# Patient Record
Sex: MALE | Race: WHITE | NOT HISPANIC OR LATINO | Employment: OTHER | URBAN - METROPOLITAN AREA
[De-identification: names, ages, dates, MRNs, and addresses within clinical notes are randomized per-mention and may not be internally consistent; named-entity substitution may affect disease eponyms.]

---

## 2023-03-16 LAB
ACTIVATED PARTIAL THROMBOPLASTIN TIME IN PPP BY COAGULATION ASSAY: 36 SEC (ref 26–39)
ANION GAP IN SER/PLAS: 14 MMOL/L (ref 10–20)
CALCIUM (MG/DL) IN SER/PLAS: 9.3 MG/DL (ref 8.6–10.3)
CARBON DIOXIDE, TOTAL (MMOL/L) IN SER/PLAS: 25 MMOL/L (ref 21–32)
CHLORIDE (MMOL/L) IN SER/PLAS: 106 MMOL/L (ref 98–107)
CREATININE (MG/DL) IN SER/PLAS: 0.91 MG/DL (ref 0.5–1.3)
ERYTHROCYTE DISTRIBUTION WIDTH (RATIO) BY AUTOMATED COUNT: 12.5 % (ref 11.5–14.5)
ERYTHROCYTE MEAN CORPUSCULAR HEMOGLOBIN CONCENTRATION (G/DL) BY AUTOMATED: 33.7 G/DL (ref 32–36)
ERYTHROCYTE MEAN CORPUSCULAR VOLUME (FL) BY AUTOMATED COUNT: 93 FL (ref 80–100)
ERYTHROCYTES (10*6/UL) IN BLOOD BY AUTOMATED COUNT: 5.29 X10E12/L (ref 4.5–5.9)
GFR MALE: >90 ML/MIN/1.73M2
GLUCOSE (MG/DL) IN SER/PLAS: 100 MG/DL (ref 74–99)
HEMATOCRIT (%) IN BLOOD BY AUTOMATED COUNT: 49.2 % (ref 41–52)
HEMOGLOBIN (G/DL) IN BLOOD: 16.6 G/DL (ref 13.5–17.5)
INR IN PPP BY COAGULATION ASSAY: 1.1 (ref 0.9–1.1)
LEUKOCYTES (10*3/UL) IN BLOOD BY AUTOMATED COUNT: 9.1 X10E9/L (ref 4.4–11.3)
NRBC (PER 100 WBCS) BY AUTOMATED COUNT: 0 /100 WBC (ref 0–0)
PLATELETS (10*3/UL) IN BLOOD AUTOMATED COUNT: 210 X10E9/L (ref 150–450)
POTASSIUM (MMOL/L) IN SER/PLAS: 4.2 MMOL/L (ref 3.5–5.3)
PROTHROMBIN TIME (PT) IN PPP BY COAGULATION ASSAY: 12.7 SEC (ref 9.8–13.4)
SODIUM (MMOL/L) IN SER/PLAS: 141 MMOL/L (ref 136–145)
UREA NITROGEN (MG/DL) IN SER/PLAS: 12 MG/DL (ref 6–23)

## 2023-03-17 LAB — URINE CULTURE: NORMAL

## 2023-03-27 ENCOUNTER — HOSPITAL ENCOUNTER (OUTPATIENT)
Dept: DATA CONVERSION | Facility: HOSPITAL | Age: 38
End: 2023-03-27
Attending: UROLOGY | Admitting: UROLOGY
Payer: COMMERCIAL

## 2023-03-27 DIAGNOSIS — Z90.89 ACQUIRED ABSENCE OF OTHER ORGANS: ICD-10-CM

## 2023-03-27 DIAGNOSIS — N43.3 HYDROCELE, UNSPECIFIED: ICD-10-CM

## 2023-03-27 DIAGNOSIS — Z87.19 PERSONAL HISTORY OF OTHER DISEASES OF THE DIGESTIVE SYSTEM: ICD-10-CM

## 2023-03-27 DIAGNOSIS — F17.200 NICOTINE DEPENDENCE, UNSPECIFIED, UNCOMPLICATED: ICD-10-CM

## 2023-03-27 DIAGNOSIS — R56.9 UNSPECIFIED CONVULSIONS (MULTI): ICD-10-CM

## 2023-04-17 LAB
COMPLETE PATHOLOGY REPORT: NORMAL
CONVERTED CLINICAL DIAGNOSIS-HISTORY: NORMAL
CONVERTED FINAL DIAGNOSIS: NORMAL
CONVERTED FINAL REPORT PDF LINK TO COPY AND PASTE: NORMAL
CONVERTED GROSS DESCRIPTION: NORMAL

## 2023-04-25 ENCOUNTER — TELEMEDICINE (OUTPATIENT)
Dept: PRIMARY CARE | Facility: CLINIC | Age: 38
End: 2023-04-25
Payer: COMMERCIAL

## 2023-04-25 DIAGNOSIS — J01.00 ACUTE MAXILLARY SINUSITIS, RECURRENCE NOT SPECIFIED: Primary | ICD-10-CM

## 2023-04-25 PROCEDURE — 99213 OFFICE O/P EST LOW 20 MIN: CPT | Performed by: FAMILY MEDICINE

## 2023-04-25 RX ORDER — SULFAMETHOXAZOLE AND TRIMETHOPRIM 800; 160 MG/1; MG/1
1 TABLET ORAL 2 TIMES DAILY
Qty: 20 TABLET | Refills: 0 | Status: SHIPPED | OUTPATIENT
Start: 2023-04-25 | End: 2023-05-05

## 2023-04-25 ASSESSMENT — ENCOUNTER SYMPTOMS
TROUBLE SWALLOWING: 0
STRIDOR: 0
VOMITING: 0
HEADACHES: 1
NECK PAIN: 1
COUGH: 0
DIARRHEA: 0
SWOLLEN GLANDS: 0
HOARSE VOICE: 1
ABDOMINAL PAIN: 0
SORE THROAT: 1
SHORTNESS OF BREATH: 0

## 2023-04-25 NOTE — PROGRESS NOTES
Subjective   Patient ID: [id number]   Virtual or Telephone Consent    An interactive audio and video telecommunication system which permits real time communications between the patient (at the originating site) and provider (at the distant site) was utilized to provide this telehealth service.   Verbal consent was requested and obtained from Lacho Calix on this date, 04/25/23 for a telehealth visit.     Lacho Calix is a 37 y.o. male who presents for No chief complaint on file..  Sore Throat   This is a new problem. The current episode started yesterday. The problem has been gradually worsening. The pain is worse on the left side. There has been no fever. The fever has been present for Less than 1 day. The pain is at a severity of 5/10. Associated symptoms include congestion, ear discharge, ear pain, headaches, a hoarse voice, a plugged ear sensation and neck pain. Pertinent negatives include no abdominal pain, coughing, diarrhea, drooling, shortness of breath, stridor, swollen glands, trouble swallowing or vomiting. He has had exposure to strep. He has had no exposure to mono.     Above answers confirmed with pt.  His wife had strep throat last week.  He started with this on Sunday.  No fevers.  Has a sore throat.  Objective     There were no vitals taken for this visit.     Physical Exam  Constitutional:       Appearance: Normal appearance.   Pulmonary:      Effort: Pulmonary effort is normal. No respiratory distress.   Neurological:      Mental Status: He is alert.         Assessment/Plan   Problem List Items Addressed This Visit    None  Visit Diagnoses       Acute maxillary sinusitis, recurrence not specified    -  Primary    Relevant Medications    sulfamethoxazole-trimethoprim (Bactrim DS) 800-160 mg tablet            Addi Mendenhall DO

## 2023-05-11 ENCOUNTER — TELEMEDICINE (OUTPATIENT)
Dept: PRIMARY CARE | Facility: CLINIC | Age: 38
End: 2023-05-11
Payer: COMMERCIAL

## 2023-05-11 DIAGNOSIS — J02.9 PHARYNGITIS, UNSPECIFIED ETIOLOGY: Primary | ICD-10-CM

## 2023-05-11 PROCEDURE — 99213 OFFICE O/P EST LOW 20 MIN: CPT | Performed by: FAMILY MEDICINE

## 2023-05-11 RX ORDER — AMOXICILLIN AND CLAVULANATE POTASSIUM 875; 125 MG/1; MG/1
875 TABLET, FILM COATED ORAL 2 TIMES DAILY
Qty: 20 TABLET | Refills: 0 | Status: SHIPPED | OUTPATIENT
Start: 2023-05-11 | End: 2023-05-21

## 2023-05-11 ASSESSMENT — ENCOUNTER SYMPTOMS
RHINORRHEA: 1
HEADACHES: 1
VOMITING: 0
COUGH: 1
SORE THROAT: 1
DIARRHEA: 0
NECK PAIN: 0
ABDOMINAL PAIN: 0

## 2023-05-11 NOTE — PROGRESS NOTES
Subjective   Patient ID: [id number]   Virtual or Telephone Consent    An interactive audio and video telecommunication system which permits real time communications between the patient (at the originating site) and provider (at the distant site) was utilized to provide this telehealth service.   Verbal consent was requested and obtained from Lacho Claix on this date, 05/11/23 for a telehealth visit.     Lacho Calix is a 37 y.o. male who presents for clogged ears.  Earache   There is pain in the left ear. This is a new problem. The current episode started in the past 7 days. The problem occurs constantly. The problem has been gradually worsening. There has been no fever. The fever has been present for Less than 1 day. The pain is at a severity of 5/10. Associated symptoms include coughing, ear discharge, headaches, hearing loss, rhinorrhea and a sore throat. Pertinent negatives include no abdominal pain, diarrhea, neck pain, rash or vomiting.     He has right ear pain with swallowing and a sore throat.  He had sinus problems earlier and the Bactrim helped.  He states these complaints have persisted despite the bactrim.    Objective     There were no vitals taken for this visit.     Physical Exam  Constitutional:       General: He is not in acute distress.     Appearance: Normal appearance. He is not toxic-appearing.   Pulmonary:      Effort: Pulmonary effort is normal. No respiratory distress.   Neurological:      General: No focal deficit present.      Mental Status: He is alert and oriented to person, place, and time.         Assessment/Plan   Problem List Items Addressed This Visit    None  Visit Diagnoses       Pharyngitis, unspecified etiology    -  Primary    Relevant Medications    amoxicillin-pot clavulanate (Augmentin) 875-125 mg tablet          I will prescribe augmentin to try to help with this infection.  Continue the use of Claritin D.  Return if this persists.  Addi Mendenhall, DO

## 2023-05-11 NOTE — PATIENT INSTRUCTIONS
I will prescribe augmentin to try to help with this infection.  Continue the use of Claritin D.  Return if this persists.

## 2023-09-07 VITALS — BODY MASS INDEX: 39.17 KG/M2 | HEIGHT: 75 IN | WEIGHT: 315 LBS

## 2023-09-14 NOTE — H&P
History of Present Illness:   History Present Illness:  Reason for surgery: left hydrocele   HPI:    37M with left hydrocele presents for left hydrocelectomy.     Allergies:        Allergies:  ·  Darvocet-N 100 : Unknown  ·  Cortone  Acetate: Unknown  ·  Percocet : Unknown    Home Medication Review:   Home Medications Reviewed: yes     Impression/Procedure:   ·  Impression and Planned Procedure: left hydrocele; left hydrocelectomy       ERAS (Enhanced Recovery After Surgery):  ·  ERAS Patient: no       Physical Exam by System:    Respiratory/Thorax: NLB   Cardiovascular: RR     Consent:   COVID-19 Consent:  ·  COVID-19 Risk Consent Surgeon has reviewed key risks related to the risk of andriy COVID-19 and if they contract COVID-19 what the risks are.     Attestation:   Note Completion:  I am a:  Resident/Fellow   Attending Attestation I saw and evaluated the patient.  I personally obtained the key and critical portions of the history and physical exam or was physically present for key and  critical portions performed by the resident/fellow. I reviewed the resident/fellow?s documentation and discussed the patient with the resident/fellow.  I agree with the resident/fellow?s medical decision making as documented in the note.     I personally evaluated the patient on 27-Mar-2023         Electronic Signatures:  Kadeem Carter)  (Signed 28-Mar-2023 06:33)   Authored: Note Completion   Co-Signer: History of Present Illness, Allergies, Home Medication Review, Impression/Procedure, ERAS, Physical Exam, Consent, Note Completion  Claudia Jackson (Resident))  (Signed 27-Mar-2023 13:39)   Authored: History of Present Illness, Allergies, Home  Medication Review, Impression/Procedure, ERAS, Physical Exam, Consent, Note Completion      Last Updated: 28-Mar-2023 06:33 by Kadeem Carter)

## 2023-10-02 NOTE — OP NOTE
PROCEDURE DETAILS    Preoperative Diagnosis:  left hydrocele   Postoperative Diagnosis:  left hydrocele   Surgeon: Dr. Kadeem Carter   Resident/Fellow/Other Assistant: Dr. Claudia Jackson     Procedure:  1. left hydrocelectomy   Estimated Blood Loss: 5cc  Findings: left septated hydrocele   Specimens(s) Collected: yes,  hydrocele sac    Drains and/or Catheters: none        Operative Report:   This is a 37-year-old male who presented with left-sided scrotal swelling and was found to have a left hydrocele on exam.  The hydrocele was confirmed on ultrasound  examination.  He presents today for left hydrocelectomy.  The procedure including the risk and benefits were explained to the patient and informed consent was obtained.    Procedure: The patient was brought to the operating room placed on table in the supine position.  A timeout is performed confirming patient and  the procedure.  Preoperative antibiotics, 3 g Ancef, were administered and general anesthesia was induced.  He was prepped and draped in standard fashion.  A transverse 4 cm left lower scrotal incision was made in the left hemiscrotum.  The dartos was  dissected using electrocautery down to the hydrocele sac.  The testicle and hydrocele sac were delivered through the incision and further dissection of dartos was completed until the cord was identified and isolated.  The hydrocele was noted to be septated.   The hydrocele sac was opened and drained with return of straw-colored fluid.  Excess hydrocele sac was excised and sent to pathology for analysis.  The hydrocele was then repaired in a Jaboulay fashion inverting the edges of the hydrocele sac and closing  them with a 2-0 Vicryl in a running fashion for hemostasis.  Care was taken to not place too much tension on the cord and do not involve the epididymis.  Hemostasis was confirmed and the testicle was placed back into the left hemiscrotum.  The dartos  was closed with a running 2-0 Vicryl.  Again  hemostasis was confirmed.  The skin was then closed with a running 4-0 Vicryl horizontal mattress stitch and Dermabond.  Local anesthetic was injected around the incision and a mummy wrap using Coban was applied.   This concluded the procedure.  Patient tolerated procedure well.  He was awoken from general anesthesia and taken to PACU in stable condition.                        Attestation:   Note Completion:  Attending Attestation I was present for the entire procedure    I am a: Resident/Fellow         Electronic Signatures:  Kadeem Carter)  (Signed 28-Mar-2023 06:34)   Authored: Note Completion   Co-Signer: Post-Operative Note, Chart Review, Note Completion  Claudia Jackson (Resident))  (Signed 27-Mar-2023 15:40)   Authored: Post-Operative Note, Chart Review, Note Completion      Last Updated: 28-Mar-2023 06:34 by Kadeem Carter)

## 2024-01-05 DIAGNOSIS — K21.9 GASTRO-ESOPHAGEAL REFLUX DISEASE WITHOUT ESOPHAGITIS: ICD-10-CM

## 2024-01-05 RX ORDER — OMEPRAZOLE 40 MG/1
CAPSULE, DELAYED RELEASE ORAL
Qty: 30 CAPSULE | Refills: 5 | Status: SHIPPED | OUTPATIENT
Start: 2024-01-05

## 2024-01-10 ENCOUNTER — HOSPITAL ENCOUNTER (OUTPATIENT)
Facility: HOSPITAL | Age: 39
Setting detail: OUTPATIENT SURGERY
Discharge: HOME | End: 2024-01-10
Attending: PODIATRIST | Admitting: PODIATRIST
Payer: COMMERCIAL

## 2024-01-10 ENCOUNTER — ANESTHESIA (OUTPATIENT)
Dept: OPERATING ROOM | Facility: HOSPITAL | Age: 39
End: 2024-01-10
Payer: COMMERCIAL

## 2024-01-10 ENCOUNTER — PHARMACY VISIT (OUTPATIENT)
Dept: PHARMACY | Facility: CLINIC | Age: 39
End: 2024-01-10
Payer: COMMERCIAL

## 2024-01-10 ENCOUNTER — ANESTHESIA EVENT (OUTPATIENT)
Dept: OPERATING ROOM | Facility: HOSPITAL | Age: 39
End: 2024-01-10
Payer: COMMERCIAL

## 2024-01-10 VITALS
RESPIRATION RATE: 18 BRPM | SYSTOLIC BLOOD PRESSURE: 142 MMHG | TEMPERATURE: 96.8 F | BODY MASS INDEX: 46.42 KG/M2 | DIASTOLIC BLOOD PRESSURE: 77 MMHG | WEIGHT: 315 LBS | OXYGEN SATURATION: 96 % | HEART RATE: 79 BPM

## 2024-01-10 DIAGNOSIS — M77.32 CALCANEAL SPUR OF LEFT FOOT: ICD-10-CM

## 2024-01-10 DIAGNOSIS — M77.52 TENDINITIS OF LEFT ANKLE: ICD-10-CM

## 2024-01-10 DIAGNOSIS — L84 CORNS AND CALLOSITIES: ICD-10-CM

## 2024-01-10 DIAGNOSIS — Z98.890 POSTOPERATIVE STATE: Primary | ICD-10-CM

## 2024-01-10 DIAGNOSIS — M76.62 TENDONITIS, ACHILLES, LEFT: ICD-10-CM

## 2024-01-10 DIAGNOSIS — G89.18 POSTOPERATIVE PAIN: ICD-10-CM

## 2024-01-10 PROCEDURE — 88305 TISSUE EXAM BY PATHOLOGIST: CPT | Performed by: PATHOLOGY

## 2024-01-10 PROCEDURE — 2500000005 HC RX 250 GENERAL PHARMACY W/O HCPCS: Performed by: ANESTHESIOLOGY

## 2024-01-10 PROCEDURE — 3700000001 HC GENERAL ANESTHESIA TIME - INITIAL BASE CHARGE: Performed by: PODIATRIST

## 2024-01-10 PROCEDURE — 2780000003 HC OR 278 NO HCPCS: Performed by: PODIATRIST

## 2024-01-10 PROCEDURE — 3600000008 HC OR TIME - EACH INCREMENTAL 1 MINUTE - PROCEDURE LEVEL THREE: Performed by: PODIATRIST

## 2024-01-10 PROCEDURE — RXMED WILLOW AMBULATORY MEDICATION CHARGE

## 2024-01-10 PROCEDURE — 2720000007 HC OR 272 NO HCPCS: Performed by: PODIATRIST

## 2024-01-10 PROCEDURE — 3700000002 HC GENERAL ANESTHESIA TIME - EACH INCREMENTAL 1 MINUTE: Performed by: PODIATRIST

## 2024-01-10 PROCEDURE — 3600000003 HC OR TIME - INITIAL BASE CHARGE - PROCEDURE LEVEL THREE: Performed by: PODIATRIST

## 2024-01-10 PROCEDURE — 2500000004 HC RX 250 GENERAL PHARMACY W/ HCPCS (ALT 636 FOR OP/ED)

## 2024-01-10 PROCEDURE — 2500000004 HC RX 250 GENERAL PHARMACY W/ HCPCS (ALT 636 FOR OP/ED): Performed by: ANESTHESIOLOGY

## 2024-01-10 PROCEDURE — 2500000004 HC RX 250 GENERAL PHARMACY W/ HCPCS (ALT 636 FOR OP/ED): Performed by: PODIATRIST

## 2024-01-10 PROCEDURE — 7100000009 HC PHASE TWO TIME - INITIAL BASE CHARGE: Performed by: PODIATRIST

## 2024-01-10 PROCEDURE — 88305 TISSUE EXAM BY PATHOLOGIST: CPT | Mod: TC | Performed by: PODIATRIST

## 2024-01-10 PROCEDURE — 2500000002 HC RX 250 W HCPCS SELF ADMINISTERED DRUGS (ALT 637 FOR MEDICARE OP, ALT 636 FOR OP/ED): Performed by: ANESTHESIOLOGY

## 2024-01-10 PROCEDURE — 7100000010 HC PHASE TWO TIME - EACH INCREMENTAL 1 MINUTE: Performed by: PODIATRIST

## 2024-01-10 PROCEDURE — 94760 N-INVAS EAR/PLS OXIMETRY 1: CPT

## 2024-01-10 PROCEDURE — 7100000001 HC RECOVERY ROOM TIME - INITIAL BASE CHARGE: Performed by: PODIATRIST

## 2024-01-10 PROCEDURE — 7100000002 HC RECOVERY ROOM TIME - EACH INCREMENTAL 1 MINUTE: Performed by: PODIATRIST

## 2024-01-10 PROCEDURE — C1713 ANCHOR/SCREW BN/BN,TIS/BN: HCPCS | Performed by: PODIATRIST

## 2024-01-10 DEVICE — IMPL SYS,BIO-COMP ACHILLES SPEEDBRG
Type: IMPLANTABLE DEVICE | Site: ANKLE | Status: FUNCTIONAL
Brand: ARTHREX®

## 2024-01-10 RX ORDER — SODIUM CHLORIDE, SODIUM LACTATE, POTASSIUM CHLORIDE, CALCIUM CHLORIDE 600; 310; 30; 20 MG/100ML; MG/100ML; MG/100ML; MG/100ML
50 INJECTION, SOLUTION INTRAVENOUS CONTINUOUS
Status: DISCONTINUED | OUTPATIENT
Start: 2024-01-10 | End: 2024-01-10 | Stop reason: HOSPADM

## 2024-01-10 RX ORDER — DEXAMETHASONE SODIUM PHOSPHATE 4 MG/ML
INJECTION, SOLUTION INTRA-ARTICULAR; INTRALESIONAL; INTRAMUSCULAR; INTRAVENOUS; SOFT TISSUE AS NEEDED
Status: DISCONTINUED | OUTPATIENT
Start: 2024-01-10 | End: 2024-01-10

## 2024-01-10 RX ORDER — ONDANSETRON HYDROCHLORIDE 2 MG/ML
4 INJECTION, SOLUTION INTRAVENOUS ONCE AS NEEDED
Status: DISCONTINUED | OUTPATIENT
Start: 2024-01-10 | End: 2024-01-10 | Stop reason: HOSPADM

## 2024-01-10 RX ORDER — MIDAZOLAM HYDROCHLORIDE 1 MG/ML
INJECTION, SOLUTION INTRAMUSCULAR; INTRAVENOUS AS NEEDED
Status: DISCONTINUED | OUTPATIENT
Start: 2024-01-10 | End: 2024-01-10

## 2024-01-10 RX ORDER — HYDROCODONE BITARTRATE AND ACETAMINOPHEN 5; 325 MG/1; MG/1
1 TABLET ORAL 4 TIMES DAILY PRN
Qty: 28 TABLET | Refills: 0
Start: 2024-01-10 | End: 2024-01-17

## 2024-01-10 RX ORDER — CEFAZOLIN SODIUM IN 0.9 % NACL 3 G/100 ML
3 INTRAVENOUS SOLUTION, PIGGYBACK (ML) INTRAVENOUS ONCE
Status: COMPLETED | OUTPATIENT
Start: 2024-01-10 | End: 2024-01-10

## 2024-01-10 RX ORDER — ONDANSETRON 4 MG/1
8 TABLET, FILM COATED ORAL EVERY 8 HOURS PRN
Qty: 60 TABLET | Refills: 0 | Status: SHIPPED | OUTPATIENT
Start: 2024-01-10 | End: 2024-02-09

## 2024-01-10 RX ORDER — SODIUM CHLORIDE, SODIUM LACTATE, POTASSIUM CHLORIDE, CALCIUM CHLORIDE 600; 310; 30; 20 MG/100ML; MG/100ML; MG/100ML; MG/100ML
100 INJECTION, SOLUTION INTRAVENOUS CONTINUOUS
Status: DISCONTINUED | OUTPATIENT
Start: 2024-01-10 | End: 2024-01-10 | Stop reason: HOSPADM

## 2024-01-10 RX ORDER — ROCURONIUM BROMIDE 10 MG/ML
INJECTION, SOLUTION INTRAVENOUS AS NEEDED
Status: DISCONTINUED | OUTPATIENT
Start: 2024-01-10 | End: 2024-01-10

## 2024-01-10 RX ORDER — HYDRALAZINE HYDROCHLORIDE 20 MG/ML
5 INJECTION INTRAMUSCULAR; INTRAVENOUS EVERY 30 MIN PRN
Status: DISCONTINUED | OUTPATIENT
Start: 2024-01-10 | End: 2024-01-10 | Stop reason: HOSPADM

## 2024-01-10 RX ORDER — BUPIVACAINE HYDROCHLORIDE 5 MG/ML
INJECTION, SOLUTION EPIDURAL; INTRACAUDAL AS NEEDED
Status: DISCONTINUED | OUTPATIENT
Start: 2024-01-10 | End: 2024-01-10 | Stop reason: HOSPADM

## 2024-01-10 RX ORDER — PROPOFOL 10 MG/ML
INJECTION, EMULSION INTRAVENOUS AS NEEDED
Status: DISCONTINUED | OUTPATIENT
Start: 2024-01-10 | End: 2024-01-10

## 2024-01-10 RX ORDER — ALBUTEROL SULFATE 0.83 MG/ML
2.5 SOLUTION RESPIRATORY (INHALATION) ONCE AS NEEDED
Status: COMPLETED | OUTPATIENT
Start: 2024-01-10 | End: 2024-01-10

## 2024-01-10 RX ORDER — LIDOCAINE HYDROCHLORIDE 10 MG/ML
0.1 INJECTION, SOLUTION EPIDURAL; INFILTRATION; INTRACAUDAL; PERINEURAL ONCE
Status: DISCONTINUED | OUTPATIENT
Start: 2024-01-10 | End: 2024-01-10 | Stop reason: HOSPADM

## 2024-01-10 RX ORDER — SUCCINYLCHOLINE CHLORIDE 20 MG/ML
INJECTION INTRAMUSCULAR; INTRAVENOUS AS NEEDED
Status: DISCONTINUED | OUTPATIENT
Start: 2024-01-10 | End: 2024-01-10

## 2024-01-10 RX ORDER — MIDAZOLAM HYDROCHLORIDE 5 MG/ML
1 INJECTION, SOLUTION INTRAMUSCULAR; INTRAVENOUS ONCE AS NEEDED
Status: DISCONTINUED | OUTPATIENT
Start: 2024-01-10 | End: 2024-01-10 | Stop reason: HOSPADM

## 2024-01-10 RX ORDER — ONDANSETRON HYDROCHLORIDE 2 MG/ML
INJECTION, SOLUTION INTRAVENOUS AS NEEDED
Status: DISCONTINUED | OUTPATIENT
Start: 2024-01-10 | End: 2024-01-10

## 2024-01-10 RX ORDER — CYCLOBENZAPRINE HCL 5 MG
5 TABLET ORAL NIGHTLY PRN
Qty: 20 TABLET | Refills: 0 | OUTPATIENT
Start: 2024-01-10 | End: 2024-01-30

## 2024-01-10 RX ORDER — ASPIRIN 325 MG
325 TABLET, DELAYED RELEASE (ENTERIC COATED) ORAL DAILY
Qty: 30 TABLET | Refills: 0 | Status: SHIPPED | OUTPATIENT
Start: 2024-01-10 | End: 2024-02-09

## 2024-01-10 RX ORDER — FENTANYL CITRATE 50 UG/ML
INJECTION, SOLUTION INTRAMUSCULAR; INTRAVENOUS AS NEEDED
Status: DISCONTINUED | OUTPATIENT
Start: 2024-01-10 | End: 2024-01-10

## 2024-01-10 RX ORDER — HYDROCODONE BITARTRATE AND ACETAMINOPHEN 5; 325 MG/1; MG/1
1 TABLET ORAL EVERY 6 HOURS PRN
Qty: 28 TABLET | Refills: 0 | Status: SHIPPED | OUTPATIENT
Start: 2024-01-10 | End: 2024-01-17

## 2024-01-10 RX ORDER — FENTANYL CITRATE 50 UG/ML
50 INJECTION, SOLUTION INTRAMUSCULAR; INTRAVENOUS EVERY 5 MIN PRN
Status: DISCONTINUED | OUTPATIENT
Start: 2024-01-10 | End: 2024-01-10 | Stop reason: HOSPADM

## 2024-01-10 RX ORDER — CYCLOBENZAPRINE HCL 10 MG
10 TABLET ORAL 3 TIMES DAILY PRN
Qty: 45 TABLET | Refills: 0 | Status: SHIPPED | OUTPATIENT
Start: 2024-01-10 | End: 2024-03-05 | Stop reason: WASHOUT

## 2024-01-10 RX ADMIN — ONDANSETRON HYDROCHLORIDE 4 MG: 2 INJECTION INTRAMUSCULAR; INTRAVENOUS at 10:29

## 2024-01-10 RX ADMIN — ROCURONIUM BROMIDE 30 MG: 10 INJECTION, SOLUTION INTRAVENOUS at 10:33

## 2024-01-10 RX ADMIN — FENTANYL CITRATE 50 MCG: 50 INJECTION INTRAMUSCULAR; INTRAVENOUS at 11:46

## 2024-01-10 RX ADMIN — FENTANYL CITRATE 50 MCG: 50 INJECTION INTRAMUSCULAR; INTRAVENOUS at 12:00

## 2024-01-10 RX ADMIN — FENTANYL CITRATE 50 MCG: 0.05 INJECTION, SOLUTION INTRAMUSCULAR; INTRAVENOUS at 10:09

## 2024-01-10 RX ADMIN — MIDAZOLAM HYDROCHLORIDE 3 MG: 1 INJECTION, SOLUTION INTRAMUSCULAR; INTRAVENOUS at 10:09

## 2024-01-10 RX ADMIN — ALBUTEROL SULFATE 2.5 MG: 2.5 SOLUTION RESPIRATORY (INHALATION) at 11:38

## 2024-01-10 RX ADMIN — FENTANYL CITRATE 100 MCG: 0.05 INJECTION, SOLUTION INTRAMUSCULAR; INTRAVENOUS at 10:29

## 2024-01-10 RX ADMIN — SUCCINYLCHOLINE CHLORIDE 160 MG: 20 INJECTION, SOLUTION INTRAMUSCULAR; INTRAVENOUS at 10:09

## 2024-01-10 RX ADMIN — SUGAMMADEX 200 MG: 100 INJECTION, SOLUTION INTRAVENOUS at 11:31

## 2024-01-10 RX ADMIN — ROCURONIUM BROMIDE 50 MG: 10 INJECTION, SOLUTION INTRAVENOUS at 10:09

## 2024-01-10 RX ADMIN — DEXAMETHASONE SODIUM PHOSPHATE 4 MG: 4 INJECTION, SOLUTION INTRA-ARTICULAR; INTRALESIONAL; INTRAMUSCULAR; INTRAVENOUS; SOFT TISSUE at 10:29

## 2024-01-10 RX ADMIN — Medication 3 G: at 10:14

## 2024-01-10 RX ADMIN — PROPOFOL 200 MG: 10 INJECTION, EMULSION INTRAVENOUS at 10:09

## 2024-01-10 RX ADMIN — HYDROMORPHONE HYDROCHLORIDE 0.4 MG: 2 INJECTION, SOLUTION INTRAMUSCULAR; INTRAVENOUS; SUBCUTANEOUS at 12:14

## 2024-01-10 SDOH — HEALTH STABILITY: MENTAL HEALTH: CURRENT SMOKER: 0

## 2024-01-10 ASSESSMENT — PAIN - FUNCTIONAL ASSESSMENT
PAIN_FUNCTIONAL_ASSESSMENT: 0-10

## 2024-01-10 ASSESSMENT — PAIN SCALES - GENERAL
PAINLEVEL_OUTOF10: 7
PAINLEVEL_OUTOF10: 10 - WORST POSSIBLE PAIN
PAINLEVEL_OUTOF10: 6
PAINLEVEL_OUTOF10: 2
PAINLEVEL_OUTOF10: 5 - MODERATE PAIN
PAIN_LEVEL: 2
PAINLEVEL_OUTOF10: 3
PAINLEVEL_OUTOF10: 6

## 2024-01-10 ASSESSMENT — PAIN DESCRIPTION - LOCATION
LOCATION: FOOT

## 2024-01-10 ASSESSMENT — PAIN DESCRIPTION - ORIENTATION
ORIENTATION: LEFT

## 2024-01-10 ASSESSMENT — COLUMBIA-SUICIDE SEVERITY RATING SCALE - C-SSRS
1. IN THE PAST MONTH, HAVE YOU WISHED YOU WERE DEAD OR WISHED YOU COULD GO TO SLEEP AND NOT WAKE UP?: NO
2. HAVE YOU ACTUALLY HAD ANY THOUGHTS OF KILLING YOURSELF?: NO
6. HAVE YOU EVER DONE ANYTHING, STARTED TO DO ANYTHING, OR PREPARED TO DO ANYTHING TO END YOUR LIFE?: NO

## 2024-01-10 ASSESSMENT — PAIN DESCRIPTION - DESCRIPTORS
DESCRIPTORS: ACHING
DESCRIPTORS: ACHING

## 2024-01-10 NOTE — DISCHARGE INSTRUCTIONS
PODIATRIC SURGERY POST-OP INSTRUCTIONS    YOU HAD ANESTHESIA:  You must have a responsible adult drive you home and stay with you for the first 24 hours.    Do not drive a car or drink any alcohol for 24 hours after surgery.  Do not do any strenuous work or activity for the next 24 hours.  You may have mild nausea, a sore throat or raspy voice for a few days.    You received a block prior to your procedure. You should expect the surgical extremity to remain numb for the next 24-48 hours.     POST-OP INSTRUCTIONS:  Keep dressing clean, dry and intact until your first post-operative appointment.  Do not remove surgical dressing. You may need to loosen if necessary.   Do not shower unless using a cast protector to protect dressing.  If dressing gets wet, please call office immediately as this can lead to increased risk of infection or wound dehiscence.   Elevate surgical extremity as much as possible to help with pain and swelling.  Place ice pack behind knee of surgical extremity (20 minutes on/20 minutes off while awake). After 24 hours use ice behind the knee as needed for comfort.  Weight Bearing Status: Please remain non-weight bearing to the surgical extremity with posterior splint in place utilizing crutches, knee scooter or walker.  Please resume all home medications.   Post-Operative Medications: You were prescribed Norco for pain; please take as directed on the label. You may take Tylenol for pain; please take as directed on bottle. You were prescribed Xarelto (Rivaroxaban) 10 mg for post-operative blood clot prevention; please take as directed on the label. You were prescribed Flexeril (Cyclobenzaprine) 10 mg for muscle spasm control; please take as directed on the label.   Post-operative appointment with Dr. Nieves for 1/15/24 @ 2:00 PM at the [facility]. Please call to schedule if not already scheduled.  If you have any problems or notice any unusual symptoms such as bleeding, excessive pain, fever,  redness, swelling and/or discharge please call your Dr. Neives's office at [phone].    WHEN TO CALL YOUR DOCTOR:  Fever (>100.4°F or 38.0°C) or chills.  Incision problems such as redness, warmth, swelling, or foul smelling drainage.  Severe nausea or persistent vomiting.  Pain and swelling in your legs, especially if it is only on one side and not the other.  Pain with urination, cloudy urine, or foul smelling urine.  Or if you have any other problems or questions.  CALL 911 or go to the ED if you have any shortness of breath, difficulty breathing, or chest pain.

## 2024-01-10 NOTE — ANESTHESIA POSTPROCEDURE EVALUATION
Patient: Lacho Calix    Procedure Summary       Date: 01/10/24 Room / Location: [facility] / [facility]    Anesthesia Start: 0959 Anesthesia Stop: 1138    Procedures:       Excision of Calcaneal Spur (Left: Ankle)      Secondary Repair of Achilles (Left: Ankle)      Paring of Callus, EXCISION OF SOFT TISSUE MASS (Left: Ankle) Diagnosis:       Calcaneal spur of left foot      Tendinitis of left ankle      Corns and callosities      Tendonitis, Achilles, left      (Calcaneal spur, left. Navneet's deformity, left. Pain callus, left. Achilles tendinitis, left.)    Surgeons: Amanuel Nieves DPM Responsible Provider: Erik Mukherjee MD    Anesthesia Type: general ASA Status: 2            Anesthesia Type: general    Vitals Value Taken Time   /77 01/10/24 1138   Temp 37 01/10/24 1138   Pulse 76 01/10/24 1138   Resp 14 01/10/24 1138   SpO2 96 01/10/24 1138       Anesthesia Post Evaluation    Patient location during evaluation: PACU  Patient participation: complete - patient participated  Level of consciousness: sleepy but conscious  Pain score: 2  Pain management: adequate  Multimodal analgesia pain management approach  Airway patency: patent  Cardiovascular status: acceptable  Respiratory status: acceptable  Hydration status: acceptable  Postoperative Nausea and Vomiting: none        There were no known notable events for this encounter.

## 2024-01-10 NOTE — H&P
Surgical History & Physical    Patient Name and MRN: LACHO CALIX, [id number]  PCP: Addi Mendenhall DO     HPI:  Lacho Calix 38 y.o. male presents for Left Retrocalcaneal exostectomy with secondary repair of Achilles tendon with Dr. Nieves.     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    Personal history of other diseases of the musculoskeletal system and connective tissue 01/11/2016    History of back pain    Personal history of other diseases of the musculoskeletal system and connective tissue 04/07/2017    History of osteoarthritis    Personal history of other diseases of the nervous system and sense organs 08/13/2015    History of ear pain    Personal history of other diseases of the nervous system and sense organs 08/13/2015    History of impacted cerumen    Personal history of other diseases of the nervous system and sense organs 01/15/2015    History of ear pain    Personal history of other diseases of the respiratory system 08/12/2016    History of acute bronchitis    Personal history of other specified conditions 12/16/2016    History of headache    Personal history of other specified conditions 02/11/2020    History of syncope    Personal history of other specified conditions 07/18/2022    History of insomnia    Personal history of other specified conditions 10/08/2015    History of headache    Pleurodynia  02/11/2020    Chest pain, pleuritic     Past Surgical History:   Procedure Laterality Date    BACK SURGERY  06/26/2013    Back Surgery    KNEE SURGERY  03/27/2014    Knee Surgery    OTHER SURGICAL HISTORY  04/10/2014    Brain Surgery    OTHER SURGICAL HISTORY  04/10/2014    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  Social History     Tobacco Use    Smoking status: Former     Types: Cigarettes, Cigars    Smokeless tobacco: Never   Substance Use Topics    Alcohol use: Yes     Comment: socially    Drug use: Never     Prior to Admission Medications   Prescriptions Last Dose Informant Patient Reported? Taking?   lorata-dine D  mg 24 hr tablet More than a month  Yes No   Sig: Take 1 tablet by mouth once daily.   omeprazole (PriLOSEC) 40 mg DR capsule 1/9/2024  Yes Yes   Sig: Take 1 capsule (40 mg) by mouth once daily.   omeprazole (PriLOSEC) 40 mg DR capsule   No No   Sig: TAKE 1 CAPSULE BY MOUTH EVERY DAY.   tiZANidine (Zanaflex) 4 mg tablet 1/9/2024  Yes Yes   Sig: Take 2 tablets (8 mg) by mouth every 8 hours if needed.      Facility-Administered Medications: None         REVIEW OF SYSTEMS:  Constitutional:  Negative for fatigue and fever.   Respiratory:  Negative for cough, chest tightness and shortness of breath.    Cardiovascular:  Negative for chest pain, palpitations and leg swelling.   Gastrointestinal:  Negative for abdominal pain, diarrhea and nausea.   Skin: Negative.    Psychiatric/Behavioral: Negative.         PHYSICAL EXAM:  [unfilled]  Patient Vitals for the past 24 hrs:   Temp Temp src Pulse Resp   01/10/24 0918 36.4 °C (97.5 °F) Temporal 97 18       Constitutional:       Appearance: Normal appearance.   Cardiovascular:      Rate and Rhythm: Normal rate and regular rhythm.      Pulses: Normal pulses.   Pulmonary:      Effort: Pulmonary effort is normal.      Breath sounds: Normal breath sounds.   Abdominal:      General:  Abdomen is flat. Bowel sounds are normal.      Palpations: Abdomen is soft.   Musculoskeletal:         General: Normal range of motion.   Neurological:      Mental Status: She is alert.        ASSESSMENT:  38 y.o. male  presenting with Left insertionL Achilles tendinopathy and Navneet's deformity, which has been unrelieved by conservative treatment. 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    PLAN:  Patient was educated on the details of the procedure as well as medically reasonable risks, benefits, alternatives and prognosis. Patient was educated to their apparent understanding on potential complications including but not limited to infection, recurrence, persistent pain, swelling, disability, nerve injury/entrapment, healing complications, complications with anesthesia, and deep vein thrombosis/pulmonary embolism. All questions were answered to the patient’s apparent satisfaction.  No guarantees were given as to the outcome of the surgery. Patient was consented for Left Retrocalcaneal exostectomy with secondary repair of Achilles tendon.    ERAS patient?: No    COVID-19 Risk Consent:   Surgeon has reviewed the key risks related to andriy COVID-19 and subsequent sequelae.     01/10/24 at 9:21 AM - Nick Carreon DPM

## 2024-01-10 NOTE — ANESTHESIA PROCEDURE NOTES
Airway  Date/Time: 1/10/2024 10:10 AM  Urgency: elective    Airway not difficult    Staffing  Performed: attending   Authorized by: Erik Mukherjee MD    Performed by: Erik Mukherjee MD  Patient location during procedure: OR    Indications and Patient Condition  Indications for airway management: anesthesia  Spontaneous Ventilation: absent  Sedation level: deep  Preoxygenated: yes  Patient position: sniffing  MILS not maintained throughout  Mask difficulty assessment: 1 - vent by mask  Planned trial extubation    Final Airway Details  Final airway type: endotracheal airway      Successful airway: ETT  Cuffed: yes   Successful intubation technique: direct laryngoscopy  Endotracheal tube insertion site: oral  Blade: Wendy  Blade size: #3  ETT size (mm): 8.5  Cormack-Lehane Classification: grade I - full view of glottis  Placement verified by: chest auscultation and capnometry   Cuff volume (mL): 5  Measured from: lips  ETT to lips (cm): 22  Number of attempts at approach: 1

## 2024-01-10 NOTE — POST-PROCEDURE NOTE
1248- Patient pulled from PACU, report from NITA Carpio. Patient awake rates pain 3/10. Dressing to left lower extremity with ace wrap dry and intact. Toes pink and warm. Leg elevated with ice pack behind knee. Patient given cookies/crackers and ginger ale, family called to room.    1317- Discharge instructions reviewed with patient and his family, both verbalize understanding. Patient states he has a knee scooter at home.     1326- Patient sat up at the side of the bed, denies dizziness. Getting dressed with families assistance. Tolerated PO intake.    1335- Patient discharged via w/c and transport.

## 2024-01-10 NOTE — ANESTHESIA PREPROCEDURE EVALUATION
Patient: Lacho Calix    Procedure Information       Date/Time: 01/10/24 1000    Procedures:       Excision of Calcaneal Spur (Left: Ankle)      Secondary Repair of Achilles (Left: Ankle) - C-arm, KINSEY 4400 SMALL BATTERY, ARTHREX: SPEED BRIDGE - (prone)      Paring of Callus (Left: Ankle)    Location: [facility] / [facility]    Surgeons: Amanuel Nieves DPM            Relevant Problems   No relevant active problems       Clinical information reviewed:   Tobacco  Allergies  Meds   Med Hx  Surg Hx   Fam Hx  Soc Hx        NPO Detail:  No data recorded     Physical Exam    Airway  Mallampati: II  TM distance: >3 FB  Neck ROM: full     Cardiovascular - normal exam     Dental - normal exam     Pulmonary - normal exam     Abdominal - normal exam             Anesthesia Plan    History of general anesthesia?: yes  History of complications of general anesthesia?: no    ASA 2     general     The patient is not a current smoker.  Patient was not previously instructed to abstain from smoking on day of procedure.  Patient did not smoke on day of procedure.  Education provided regarding risk of obstructive sleep apnea.  intravenous induction   Postoperative administration of opioids is intended.  Trial extubation is planned.  Anesthetic plan and risks discussed with patient.  Use of blood products discussed with patient who consented to blood products.    Plan discussed with CAA, attending and CRNA.

## 2024-01-11 NOTE — OP NOTE
PODIATRIC OPERATIVE REPORT    LOG ID: [id number]  SURGERY/PROCEDURE DATE: 1/10/2024  OR LOCATION: [facility]    SURGEON(S)/PROCEDURALIST(S) AND ASSISTANT(S):  Primary: Amanuel Nieves DPM  Assistants:   Steve Dobosn DPM Fellow  Keke Barcenas, SWATI PGY-2  Nick Carreon DPM PGY-2    OTHER OR STAFF:  Circulator: Elijah Kowalski RN  Scrub Person: Radha SALMON Eulaliawillemas    SURGERY/PROCEDURE(S):  Excision of posterior calcaneal spur, left ( 55453)  Secondary repair of Achilles tendon, left ( 74027)   Excision of  benign soft tissue  lesion, left ( 26324)      PRE-OP/PRE-PROCEDURE DIAGNOSIS:    1.  Calcaneal spur, left (M 77.32)   2.  Achilles tendinitis, chronic, left ( 76.62)   3.  Benign soft tissue lesion, left foot (  D 23.72)    POST-OP/POST-PROCEDURE DIAGNOSIS: Same as Pre-Op    ANESTHESIA:   Anesthesia: Consult  ASA: II  Anesthesia Staff: Anesthesiologist: Erik Mukherjee MD  Intra-op Medications:   Medication Name Total Dose   bupivacaine PF (Marcaine) 0.5 % (5 mg/mL) injection 20 mL   ceFAZolin in 0.9% sod chloride (Ancef) IVPB 3 g 3 g       ESTIMATED BLOOD LOSS: Minimal    SPECIMENS:   Order Name Source Comment Collection Info Order Time   SURGICAL PATHOLOGY EXAM SOFT TISSUE MASS RESECTION Pre-op diagnosis:  Calcaneal spur, left. Navneet's deformity, left. Pain callus, left. Achilles tendinitis, left. Collected By: Amanuel Nieves DPM 1/10/2024 10:57 AM       IMPLANTABLE DEVICES:  Implant Name Type Inv. Item Serial No.  Lot No. LRB No. Used Action   IMPLANT SYSTEM, BIO-COMP, ACHILLES, SPEEDBRIDGE - [id number] Implant IMPLANT SYSTEM, BIO-COMP, ACHILLES, SPEEDBRIDGE  ARTHRE[employer] [facility] [id number] Left 1 Implanted       FINDINGS: Intraoperative findings consistent with clinical and radiographic findings.    TOURNIQUET TIMES:   Total Tourniquet Time Documented:  Thigh (Left) - 49 minutes  Total: Thigh (Left) - 49 minutes      COMPLICATIONS: None; patient tolerated the procedure well.       SURGERY/PROCEDURE  DETAILS:  INDICATIONS FOR PROCEDURE:    Patient was admitted to the hospital today for elective surgery consisting of secondary repair of left Achilles tendon with excision of calcaneal spurring as well as a knee excision of a benign soft tissue lesion noted on the plantar aspect of the left foot .  He is attempted multiple attempts of conservative care and has failed to have resolution of the symptoms at this time.  He underwent a full surgical consultation and informed consent was obtained.  No promises or guarantees were given at this time.    PRE-PROCEDURE INFORMATION:  In pre-op holding area, the   Left extremity to be operated on was clearly marked and the patient verified correct laterality of the marking.  The patient was brought to the operating room. A timeout was performed in which identification of the correct patient, procedure, location, and materials was done.  He was then induced under general anesthesia and placed on the operating table in the prone position. A pneumatic   Thigh tourniquet was placed on the patient's. The  left foot was then scrubbed, prepped and draped in the usual aseptic manner. An Esmarch bandage was then utilized to exsanguinate the patient's  left foot and the tourniquet was inflated to  300 mmHg.    DESCRIPTION OF PROCEDURE:    Procedure 1:   An incision was made directly over the midline of the patient's Achilles tendon.  It was carried down to the level of tendon via combination of sharp and blunt dissection.  Care was taken to retract all neurovascular structures to prevent damage at this time.  Once down to the level of the tendon the soft tissue surrounding the tendon was fully released to allow for full access to the Achilles tendon.  A 15 blade was then used at the midline of the Achilles tendon an incision was made through the Achilles tendon carried down to the level of calcaneus.  The distal aspect of the  Achilles tendon was freed from the proximal aspect of the  calcaneus to allow for full exposure of the posterior spurring.  At this time it was noted that there was significant evidence of bone spurring on the posterior aspect of the calcaneus.  Next a sagittal saw was then used in combination with an osteotome to remove all bony prominences and spurring on the posterior and dorsal aspects of the calcaneus.  Care was taken to reshaped the posterior calcaneus to ensure there was no residual bony prominences at this time.    Procedure 2:   The Achilles tendon was further debrided to remove all nonviable tissue and diseased tendon.  Next using the Arthrex speed bridge system to calcaneal anchors were placed.   Next 2 Allis clamps were placed on the posterior distal aspect of the Achilles tendon and it was properly tension.  Next the fiber tapes were passed through the Achilles tendon slightly proximal to the insertion point.  Next the hallux clamps were released.  Next 2-0 FiberWire was then used to reapproximate and repair the Achilles tendon.  The fiber tapes were then divided and 1 from each proximal anchor was split and anchored to the distal aspect of the calcaneus using 2 Arthrex SwiveLock anchors.  At this time it was noted that the Achilles tendon was taut and fully repaired at this time.    Procedure 3:   Attention was then directed to the plantar aspect of the patient's left foot.  There is a soft tissue lesion that was marked in the preoperative holding area.  A 3 1 elliptical incision was then made surrounding the benign soft tissue lesion.  It was excised in its entirety and sent to pathology for further evaluation.  The surrounding tissue was then mobilized to allow for a complete closure.  Closure of the plantar incision was done in a layered fashion using 2-0 Vicryl and 0 Prolene.      The posterior heel incision was then flushed with normal saline and closed in a layered fashion using 3-0 Vicryl, 3-0 Monocryl, and skin staples.  A dressing consisting of  Betadine-soaked  gauze, 4x4s, Kerlix, Coban and a well-padded posterior splint was applied to the patient.  The foot was then a plantar flexed position to relieve tension of the Achilles tendon.  Patient has remained nonweightbearing until follow up appointments.  The tourniquet was then let down and full perfusion of the digits with noted at this time.    POSTOPERATIVE INFORMATION: The patient tolerated the above noted procedure and anesthesia well and was transferred to the PACU with vital signs stable, and vascular status intact with capillary refill intact to the most distal aspect of the operative extremity. Postoperative instructions reviewed in detail with the patient with written instructions provided. Patient will return to clinic in approximately 3-5 days for first postoperative visit. Patient has the number of the clinic and was instructed to call prior to that time should any problems, questions, or concerns arise.    This is Steve Dobson DPM dictating the operative note for Dr. Lizbeth DPM.        SIGNATURE: Steve Dobson DPM PATIENT NAME: Lacho Calix   DATE: January 11, 2024 MRN: [id number]   TIME: 8:32 AM

## 2024-01-12 ASSESSMENT — PAIN SCALES - GENERAL: PAINLEVEL_OUTOF10: 9

## 2024-01-15 LAB
LABORATORY COMMENT REPORT: NORMAL
PATH REPORT.FINAL DX SPEC: NORMAL
PATH REPORT.GROSS SPEC: NORMAL
PATH REPORT.RELEVANT HX SPEC: NORMAL
PATH REPORT.TOTAL CANCER: NORMAL

## 2024-03-05 ENCOUNTER — OFFICE VISIT (OUTPATIENT)
Dept: PRIMARY CARE | Facility: CLINIC | Age: 39
End: 2024-03-05
Payer: COMMERCIAL

## 2024-03-05 VITALS — TEMPERATURE: 97.3 F | DIASTOLIC BLOOD PRESSURE: 78 MMHG | SYSTOLIC BLOOD PRESSURE: 136 MMHG

## 2024-03-05 DIAGNOSIS — M77.32 BONE SPUR OF INFERIOR PORTION OF LEFT CALCANEUS: ICD-10-CM

## 2024-03-05 DIAGNOSIS — K50.00 TERMINAL ILEITIS WITHOUT COMPLICATION (MULTI): ICD-10-CM

## 2024-03-05 DIAGNOSIS — R60.0 LOCALIZED EDEMA: ICD-10-CM

## 2024-03-05 DIAGNOSIS — M76.62 ACHILLES TENDINITIS OF LEFT LOWER EXTREMITY: Primary | ICD-10-CM

## 2024-03-05 PROCEDURE — 99213 OFFICE O/P EST LOW 20 MIN: CPT | Performed by: FAMILY MEDICINE

## 2024-03-05 PROCEDURE — 1036F TOBACCO NON-USER: CPT | Performed by: FAMILY MEDICINE

## 2024-03-05 RX ORDER — DULOXETIN HYDROCHLORIDE 20 MG/1
20 CAPSULE, DELAYED RELEASE ORAL 2 TIMES DAILY
COMMUNITY
End: 2024-03-05

## 2024-03-05 RX ORDER — DULOXETIN HYDROCHLORIDE 20 MG/1
20 CAPSULE, DELAYED RELEASE ORAL 2 TIMES DAILY
COMMUNITY
End: 2024-03-19 | Stop reason: WASHOUT

## 2024-03-05 RX ORDER — FUROSEMIDE 20 MG/1
20 TABLET ORAL DAILY
Qty: 30 TABLET | Refills: 0 | Status: SHIPPED | OUTPATIENT
Start: 2024-03-05 | End: 2024-05-30 | Stop reason: ALTCHOICE

## 2024-03-05 RX ORDER — HYDROCODONE BITARTRATE AND ACETAMINOPHEN 5; 325 MG/1; MG/1
1 TABLET ORAL EVERY 6 HOURS PRN
Qty: 46 TABLET | Refills: 0 | Status: SHIPPED | OUTPATIENT
Start: 2024-03-05 | End: 2024-03-19 | Stop reason: SDUPTHER

## 2024-03-05 ASSESSMENT — ENCOUNTER SYMPTOMS
TINGLING: 1
LOSS OF MOTION: 1
MUSCLE WEAKNESS: 1
INABILITY TO BEAR WEIGHT: 1
LOSS OF SENSATION: 0

## 2024-03-05 NOTE — PATIENT INSTRUCTIONS
I will refill your hydrocodone.  Return in two weeks if this persists.  Continue with avoiding weight bearing and follow up with podiatry.

## 2024-03-05 NOTE — PROGRESS NOTES
"Subjective   Patient ID: [id number]     Lacho Calix is a 38 y.o. male who presents for Leg Injury (Left Adarsh's tendon bone spur.   S/P surgery.).  Lower Extremity Issue  The symptoms are aggravated by movement, palpation and weight bearing.       He complains of a leg injury.  He injured his left Achilles tendon.  He had surgery on it 1/10/24.      His podiatrist has had him on hydrocodone since the surgery.  He has seen Dr Lizbeth DPM.    He has a boot on.  Using an knee walker to the left leg.      The wound opened up and became infected..  the wound is still not closed almost a month later.  He has not been released to put pressure on it because of the wound. He has drainage from the wound. There is \"nerve pain\" that feels like stabbing from dressing changes.    He is on hydrocodone for pain from his podiatrist.  He was told that the DPM that his office can only give five weeks of opioid medication.      He is seeing a neurologist. For the nerve pain.  He started him on cymbalta.  That has not helped yet.      \he has had ileitis seen on a colonoscopy and he has a repeat of that next year.    He denies any history of seizures or convulsions.    He states a calcaneal bone spur was \"cutting my Achilles\".        Objective     /78 (BP Location: Left arm, Patient Position: Sitting)   Temp 36.3 °C (97.3 °F) (Skin)      Physical Exam  Musculoskeletal:      Comments: NWB.  Has a knee walker for the LLE.  He is wearing a walking boot.     He has swelling at the LLE.    No redness. Mild edema.      Pictures from two weeks ago and one week ago show healing with granulation tissue.     Neurological:      Mental Status: He is alert.         Assessment/Plan   Problem List Items Addressed This Visit    None  Visit Diagnoses       Achilles tendinitis of left lower extremity    -  Primary    Bone spur of inferior portion of left calcaneus                Addi Mendenhall DO   Answers submitted by the patient for " this visit:  Lower Extremity Injury Questionnaire (Submitted on 3/5/2024)  Chief Complaint: Lower extremity pain  Incident occurred: more than 1 week ago  Incident location: other  Injury mechanism: unknown  Pain location: left heel  Pain quality: aching, burning, cramping, shooting, stabbing  Pain - numeric: 8/10  Pain course: constant  tingling: Yes  inability to bear weight: Yes  loss of motion: Yes  loss of sensation: No  muscle weakness: Yes  Foreign body present: no foreign bodies  I will refill your hydrocodone.  Return in two weeks if this persists.  Continue with avoiding weight bearing and follow up with podiatry.

## 2024-03-19 ENCOUNTER — OFFICE VISIT (OUTPATIENT)
Dept: PRIMARY CARE | Facility: CLINIC | Age: 39
End: 2024-03-19
Payer: COMMERCIAL

## 2024-03-19 VITALS — DIASTOLIC BLOOD PRESSURE: 70 MMHG | TEMPERATURE: 97.3 F | SYSTOLIC BLOOD PRESSURE: 130 MMHG

## 2024-03-19 DIAGNOSIS — R60.0 LOCALIZED EDEMA: ICD-10-CM

## 2024-03-19 PROCEDURE — 1036F TOBACCO NON-USER: CPT | Performed by: FAMILY MEDICINE

## 2024-03-19 PROCEDURE — 99213 OFFICE O/P EST LOW 20 MIN: CPT | Performed by: FAMILY MEDICINE

## 2024-03-19 RX ORDER — HYDROCODONE BITARTRATE AND ACETAMINOPHEN 5; 325 MG/1; MG/1
1 TABLET ORAL EVERY 6 HOURS PRN
Qty: 70 TABLET | Refills: 0 | Status: SHIPPED | OUTPATIENT
Start: 2024-03-19 | End: 2024-04-09 | Stop reason: SDUPTHER

## 2024-03-19 ASSESSMENT — ENCOUNTER SYMPTOMS
MUSCLE WEAKNESS: 1
INABILITY TO BEAR WEIGHT: 1
LOSS OF SENSATION: 0
LOSS OF MOTION: 1
TINGLING: 1

## 2024-03-19 NOTE — PROGRESS NOTES
Subjective   Patient ID: [id number]     Lacho Calix is a 38 y.o. male who presents for Follow-up (2 week follow up.) and ear popping (Left ear).  Lower Extremity Issue  The symptoms are aggravated by movement, palpation and weight bearing.     He is here for a two week recheck.  Answers submitted by the patient for this visit:  Lower Extremity Injury Questionnaire (Submitted on 3/19/2024)  Chief Complaint: Lower extremity pain  Incident occurred: more than 1 week ago  Incident location: other  Injury mechanism: other  Pain location: left foot  Pain quality: aching, burning, shooting, stabbing  Pain - numeric: 8/10  Pain course: fluctuating  tingling: Yes  inability to bear weight: Yes  loss of motion: Yes  loss of sensation: No  muscle weakness: Yes  Foreign body present: no foreign bodies  He is here for a recheck.  The pain is no better.  He has nerve pain.  He is seeing [facility].  They are considering taking out the spinal cord stimulator.    He sees podiatry as well.  Starts PT soon after he sees podiatry.        Objective     /70 (BP Location: Left arm, Patient Position: Sitting)   Temp 36.3 °C (97.3 °F) (Skin)      Physical Exam  Constitutional:       Appearance: He is not ill-appearing or toxic-appearing.   Neurological:      Mental Status: He is alert.      Comments: Hyperesthesia in the left posterior calcaneus area.  Open wound noted.  He is wearing a walking boot and has a knee walker here.              Assessment/Plan   Problem List Items Addressed This Visit    None  Visit Diagnoses       Localized edema        Relevant Medications    HYDROcodone-acetaminophen (Norco) 5-325 mg tablet        I refilled your medication.  Return in three weeks for  a recheck.      Addi Mendenhall DO

## 2024-03-25 ENCOUNTER — EVALUATION (OUTPATIENT)
Dept: PHYSICAL THERAPY | Facility: CLINIC | Age: 39
End: 2024-03-25
Payer: COMMERCIAL

## 2024-03-25 DIAGNOSIS — S86.002A UNSPECIFIED INJURY OF LEFT ACHILLES TENDON, INITIAL ENCOUNTER: ICD-10-CM

## 2024-03-25 DIAGNOSIS — S86.002D ACHILLES TENDON INJURY, LEFT, SUBSEQUENT ENCOUNTER: Primary | ICD-10-CM

## 2024-03-25 PROCEDURE — 97535 SELF CARE MNGMENT TRAINING: CPT | Mod: GP | Performed by: PHYSICAL THERAPIST

## 2024-03-25 PROCEDURE — 97161 PT EVAL LOW COMPLEX 20 MIN: CPT | Mod: GP | Performed by: PHYSICAL THERAPIST

## 2024-03-25 PROCEDURE — 97110 THERAPEUTIC EXERCISES: CPT | Mod: GP | Performed by: PHYSICAL THERAPIST

## 2024-03-25 ASSESSMENT — PAIN SCALES - GENERAL: PAINLEVEL_OUTOF10: 8

## 2024-03-25 ASSESSMENT — ENCOUNTER SYMPTOMS
LOSS OF SENSATION IN FEET: 1
OCCASIONAL FEELINGS OF UNSTEADINESS: 1
DEPRESSION: 0

## 2024-03-25 ASSESSMENT — PAIN - FUNCTIONAL ASSESSMENT: PAIN_FUNCTIONAL_ASSESSMENT: 0-10

## 2024-03-25 NOTE — PROGRESS NOTES
PT Initial Evaluation    Patient Name:  Lacho Calix    MRN:  [id number]    :  1985    Today's Date:  24    Time Calculation  Start Time: 1600  Stop Time: 1645  Time Calculation (min): 45 min  PT Evaluation Time Entry  PT Evaluation (Low) Time Entry: 15  PT Therapeutic Procedures Time Entry  Therapeutic Exercise Time Entry: 8  Self-Care/Home Mgmt Training: 15     Informed Consent  Patient has been informed of all evaluation findings and treatment plans and agrees to participate in Physical Therapy services and plans as outlined.    Diagnosis:  Diagnosis and Precautions: Diagnosis S86.002D (ICD-10-CM) - Unspecified injury of left Achilles tendon, subsequent encounter    Goals:   By the end of 18 visits patient will be able to do the following with < 1/10 L ANKLE/FOOT pain:    HEP:  Patient will consistently perform HER home exercise program for 20-30 minute sessions, 1-2x/day, 3-4 days/week independently by the end of 18 visits.    Basic ADL's:   Patient will perform bADL's/instrumental ADL's for 30 minutes moving between various closed kinetic chain postures.    ROM and Strength:  Patient will demonstrate 5/5 L ANKLE/FOOT strength in all planes and WNL's L ANKLE/FOOT AROM in all planes to improve their ability to lift, stand, ambulate and perform basic ADL's.    Stair Negotiation:  Patient will be able to ambulate up/down stairs for 1-2 flights at a time.    Gait/Locomotion:  Patient will be able to ambulate for 30-60 minutes at a time.  Minimal to no gait deviation across level ground/stairs.  Patient will demonstrate no L ANKLE/FOOT pain with the following movements:  partial squat, lunge, forward/lateral step-up, HR, stepdown and SLS.    WORK:  Patient will return to WORK and perform normal WORK activities pain free.    Sleep:  Patient will sleep thru the night 4/7 nights/week.    Participation restrictions:  Increase LEFS to > or = to 55/80 for increased functional ability.    Pain:  Decrease pain  at worst to < or = to 1/10 for improved QOL and ability to sleep.    No point tenderness noted over the L foot/ankle.     Plan of Care:      Treatment/Interventions: Cryotherapy, Education/ Instruction, Electrical stimulation, Gait training, Manual therapy, Neuromuscular re-education, Self care/ home management, Taping techniques, Therapeutic activities, Therapeutic exercises, Ultrasound, Vasopneumatic device  PT Plan: Skilled PT  PT Frequency: 2 times per week  Duration: 18 visits  Onset Date: 05/25/23  Certification Period Start Date: 03/25/24  Certification Period End Date: 06/23/24  Number of Treatments Authorized: 18  Rehab Potential: Good  Plan of Care Agreement: Patient    PT Assessment:    Patient is a 38 y.o. MALE with c/o L ankle pain.   Patient is alert and oriented x 3.  Patient presents with medical diagnosis of Excision of Calcaneal Spur (45450 (CPT®)) Left Consult Secondary Repair of Achilles (15540 (CPT®)) Left Consult C-arm, KINSEY 4400 SMALL BATTERY, ARTHREX: SPEED BRIDGE - (prone) Paring of Callus, EXCISION OF SOFT TISSUE MASS performed on 1/10/2024  contributing to compensatory soft tissue dysfunction, pain, stiffness and weakness of the L ankle.   Significant past medical history/past surgical history includes see above.    Skilled care is needed to progress the patient back to these activities without exacerbating symptoms.   Patient requires skilled PT services to address the problems identified and the individualized patient's goals as outlined in the problems and goals section of this evaluation.  A skilled PT is required to address these key impairments and to provide and progress with an appropriate home exercise program. Patient does have any significant PMH influencing Rx and reports motivation to return to FUNCTIONAL ACTIVITY and RETURN TO WORK.   Patient demonstrates to be a good candidate for physical therapy with good rehab potential and verbalized a good understanding of HIS  diagnosis, prognosis and treatment.  Goals have been established and reviewed with the patient.      PT Assessment Results: Decreased strength, Decreased range of motion, Decreased endurance, Impaired balance, Decreased mobility, Orthopedic restrictions, Pain  Rehab Prognosis: Good  Evaluation/Treatment Tolerance: Patient limited by fatigue, Patient limited by pain    Complexity:  Low complexity evaluation  due to a 15 minute duration, a past medical history WITH any personal factors and/or comorbidities that could impact the POC, examination of body systems completed on one to two elements, the patient presents with a stable condition.    Prognosis:  Rehab Prognosis: Good    Problem List  Activity Limitations, Decreased Functional Level, Decreased knowledge of HEP, Flexibility, Gait issues, Pain, Range of Motion/joint mobility issues, Strength, Endurance, and Return to work    Impairments   IMPAIRED ROM LOWER BODY, IMPAIRED STRENGTH LOWER BODY, IMPAIRED GAIT, IMPAIRED BALANCE, IMPAIRED SENSATION, INCREASED PAIN, IMPAIRED FUNCTIONAL ACTIVITY LEVEL, IMPAIRED FUNCTIONAL MOVEMENT PATTERNS, and INCREASED FALL RISK    Functional Limitations:  LIMITATIONS PERFORMING BASIC ADL'S, ISSUES WITH SLEEP, WORK ISSUES, PARTICIPATION IN HOBBIES, PARTICIPATION IN LEISURE ACTIVITIES, and PARTICIPATION IN HOME MANAGEMENT    General Visit Information:  Reason for Referral: PT Evaluate and Treat  Referred By: Dr. Amanuel Nieves  General Comment: Diagnosis  S86.002D (ICD-10-CM) - Unspecified injury of left Achilles tendon, subsequent encounter    Pre-Cautions:  STEADI Fall Risk Score (The score of 4 or more indicates an increased risk of falling): 9  LE Weight Bearing Status:  (WBAT L LE with boot on)  Medical Precautions:  (Back surgery x 8 (last one in 2019), spinal stimulator, R knee surgery x 3, L knee surgery x 2, R shoulder surgery x 1, Brain surgery 2013, L foot amputation, 22 nodules L lung, HTN, Lower back pain))  Post-Surgical  Precautions:  (Excision of Calcaneal Spur (02128 (CPT®)) Left Consult  Secondary Repair of Achilles (87745 (CPT®)) Left Consult  C-arm, KINSEY 4400 SMALL BATTERY, ARTHREX: SPEED BRIDGE - (prone)    Paring of Callus, EXCISION OF SOFT TISSUE MASS performed on 1/10/2024)    Protocol:  WBAT L LE with boot on    Reason for Visit:  PT Evaluate and Treat    Initial Evaluation:  Referred By: Dr. Amanuel Nieves    Insurance  Insurance reviewed  Name of Insurance:  [facility]  Visit No.  1   (EVAL) S/P LEFT ACHILLES REPAIR S86.002A 0% COINSUR/ 5000/10,000 DED / 6900/13,800 OOP / 40V CY / PA IS NOT REQ PER [employer] REF#: [id number] // Iris confirmed 3/21/24 4:02pm     Subjective:    Current Episode  Date of Onset:  May 2023  Mechanism of injury:  Patient reports injuring his L ankle/foot last year.  Patient reports having L Achilles repair on 1/10/2024 and fell during the end of January 2024 and his second fall was 2/20/2024.  Patient also reports getting over an infection in the L foot/ankle.    Pain Score:  Pain Assessment: 0-10  Pain Assessment  Pain Assessment: 0-10  Pain Score: 8 (worst 10/10, least 6/10)  Pain Type: Surgical pain  Pain Location: Ankle  Pain Orientation: Left  Pain Type: Surgical pain  Pain Location: Ankle  Pain Orientation: Left     Better with:  icing, pain meds    Worse with:  prolonged walking/standing, stairs    Medical History/Surgical History:  Medical Precautions:  (Back surgery x 8 (last one in 2019), spinal stimulator, R knee surgery x 3, L knee surgery x 2, R shoulder surgery x 1, Brain surgery 2013, L foot amputation, 22 nodules L lung, HTN, Lower back pain)    Reviewed medical history form with patient (medications/allergies reviewed with patient).  Current Outpatient Medications   Medication Instructions    furosemide (LASIX) 20 mg, oral, Daily    HYDROcodone-acetaminophen (Norco) 5-325 mg tablet 1 tablet, oral, Every 6 hours PRN    lorata-dine D  mg 24 hr tablet 1 tablet,  oral, Daily    omeprazole (PriLOSEC) 40 mg DR capsule TAKE 1 CAPSULE BY MOUTH EVERY DAY.    omeprazole (PRILOSEC) 40 mg, oral, Daily    tiZANidine (ZANAFLEX) 8 mg, oral, Every 8 hours PRN     Functional Assessment:  Level of Kenefic:  Level of Kenefic: Needs assistance with ADLs    Work Status:  EMPLOYED, refrigeration, owns business  Patient Awareness:  Patient is aware of HIS diagnosis and prognosis.  Social Support/History:  LIVES WITH FAMILY    Objective:  Restrictions as per MD's Protocol if surgical:  see script    Weightbearing Status:  WBAT L LE with boot on    Any Pre-Cautions:  fall risk    Skin:  L Achilles surgical incision healing and closed.    Palpation:   point tenderness over L Achilles incision    Sensation:  Patient denies numbness/tingling of bilateral LOWER extremities.    Gait:  patient ambulates into clinic using wheeled scooter     ROM:  AROM  L ankle DF 0 degrees  PF 30 degrees with pain  R knee AROM WNL's, L knee AROM WNL's, R hip AROM WNL's, L hip AROM WNL's, and R ankle/foot AROM WNL's    Strength:    L ankle 3/5 all planes, L knee 4/5 all planes  R knee 5/5 all planes, R hip 5/5 all planes, L hip 5/5 all planes, and R ankle/foot 5/5 all planes    Outcome measure  Lower Extremity Funtional Score (LEFS): 21/80      Treatment  Time in clinic started at  4pm  Time in clinic ended at  4:45pm  Total time in clinic is . 45 minutes  Total timed code time is  38 minutes    Treatment Performed Today:.   PT Initial Evaluation, Therapeutic Exercise, and Self-Care/Home Management HEP  Individual(s) Educated: Patient  Education Provided: Home Exercise Program  Diagnosis and Precautions: Diagnosis S86.002D (ICD-10-CM) - Unspecified injury of left Achilles tendon, subsequent encounter  Risk and Benefits Discussed with Patient/Caregiver/Other: yes  Patient/Caregiver Demonstrated Understanding: yes  Plan of Care Discussed and Agreed Upon: yes  Patient Response to Education: Patient/Caregiver  Verbalized Understanding of Information, Patient/Caregiver Performed Return Demonstration of Exercises/Activities    Patient instructed in a home exercise program, has been given handouts for each of the exercises performed and was given another sheet instructing patient in the amount of reps to perform and the jagdish to follow while doing the exercises     Access Code: [email or web address]  URL: [email or web address]  Date: 03/25/2024  Prepared by: Umer Villalta    Exercises  - Towel Scrunches  - 1 x daily - 4-5 x weekly - 2 sets - 10 reps - 5-10 hold  - Seated Ankle Alphabet  - 1 x daily - 4-5 x weekly - 1 sets - 2 reps - 2 hold  - Seated Ankle Circles  - 1 x daily - 4-5 x weekly - 2 sets - 10 reps - 5-10 hold  - Toe Spreading  - 1 x daily - 4-5 x weekly - 2 sets - 10 reps - 5-10 hold  - Supine Ankle Inversion Eversion AROM  - 1 x daily - 4-5 x weekly - 2 sets - 10 reps - 5-10 hold  - Supine Ankle Dorsiflexion and Plantarflexion AROM  - 1 x daily - 4-5 x weekly - 2 sets - 10 reps - 5-10 hold  - Isometric Ankle Eversion at Wall  - 1 x daily - 3-4 x weekly - 1 sets - 10 reps - 10 hold  - Isometric Ankle Inversion at Wall  - 1 x daily - 3-4 x weekly - 1 sets - 10 reps - 10 hold  - Long Sitting Isometric Ankle Plantarflexion with Ball at Wall  - 1 x daily - 3-4 x weekly - 1 sets - 10 reps - 10 hold  - Isometric Ankle Dorsiflexion and Plantarflexion  - 1 x daily - 3-4 x weekly - 1 sets - 10 reps - 10 hold

## 2024-03-25 NOTE — PATIENT INSTRUCTIONS
Access Code: [email or web address]  URL: [email or web address]  Date: 03/25/2024  Prepared by: Umer Villalta    Exercises  - Towel Scrunches  - 1 x daily - 4-5 x weekly - 2 sets - 10 reps - 5-10 hold  - Seated Ankle Alphabet  - 1 x daily - 4-5 x weekly - 1 sets - 2 reps - 2 hold  - Seated Ankle Circles  - 1 x daily - 4-5 x weekly - 2 sets - 10 reps - 5-10 hold  - Toe Spreading  - 1 x daily - 4-5 x weekly - 2 sets - 10 reps - 5-10 hold  - Supine Ankle Inversion Eversion AROM  - 1 x daily - 4-5 x weekly - 2 sets - 10 reps - 5-10 hold  - Supine Ankle Dorsiflexion and Plantarflexion AROM  - 1 x daily - 4-5 x weekly - 2 sets - 10 reps - 5-10 hold  - Isometric Ankle Eversion at Wall  - 1 x daily - 3-4 x weekly - 1 sets - 10 reps - 10 hold  - Isometric Ankle Inversion at Wall  - 1 x daily - 3-4 x weekly - 1 sets - 10 reps - 10 hold  - Long Sitting Isometric Ankle Plantarflexion with Ball at Wall  - 1 x daily - 3-4 x weekly - 1 sets - 10 reps - 10 hold  - Isometric Ankle Dorsiflexion and Plantarflexion  - 1 x daily - 3-4 x weekly - 1 sets - 10 reps - 10 hold

## 2024-03-29 ENCOUNTER — TREATMENT (OUTPATIENT)
Dept: PHYSICAL THERAPY | Facility: CLINIC | Age: 39
End: 2024-03-29
Payer: COMMERCIAL

## 2024-03-29 DIAGNOSIS — S86.002D ACHILLES TENDON INJURY, LEFT, SUBSEQUENT ENCOUNTER: ICD-10-CM

## 2024-03-29 PROCEDURE — 97140 MANUAL THERAPY 1/> REGIONS: CPT | Mod: GP | Performed by: PHYSICAL THERAPIST

## 2024-03-29 PROCEDURE — 97035 APP MDLTY 1+ULTRASOUND EA 15: CPT | Mod: GP | Performed by: PHYSICAL THERAPIST

## 2024-03-29 ASSESSMENT — PAIN SCALES - GENERAL: PAINLEVEL_OUTOF10: 7

## 2024-03-29 ASSESSMENT — PAIN - FUNCTIONAL ASSESSMENT: PAIN_FUNCTIONAL_ASSESSMENT: 0-10

## 2024-03-29 NOTE — PROGRESS NOTES
PHYSICAL THERAPY TREATMENT    Patient name:  Lacho Calix    MRN:  [id number]    :  1985    Today's Date:  24    Time Calculation  Start Time: 915  Stop Time: 945  Time Calculation (min): 30 min     PT Therapeutic Procedures Time Entry  Manual Therapy Time Entry: 15  PT Modalities Time Entry  Ultrasound Time Entry: 10    Referral by:  Referred By: Dr. Amanuel Nieves    Diagnoses:  Diagnosis and Precautions: Diagnosis S86.002D (ICD-10-CM) - Unspecified injury of left Achilles tendon, subsequent encounter    Assessment/Plan:  Manual therapy performed to improve L ankle mobility and reduce overall stiffness/pain.  US performed to reduce L ankle inflammation/pain.     PT Assessment  PT Assessment Results: Decreased strength, Decreased range of motion, Decreased endurance, Impaired balance, Decreased mobility, Orthopedic restrictions, Pain  Rehab Prognosis: Good  Evaluation/Treatment Tolerance: Patient limited by fatigue, Patient limited by pain  OP PT Plan  PT Plan: Skilled PT  Rehab Potential: Good  Plan of Care Agreement: Patient    General Visit Information  PT  Visit  PT Received On: 24    General  Reason for Referral: PT Evaluate and Treat  Referred By: Dr. Amanuel Nieves  General Comment: Diagnosis  S86.002D (ICD-10-CM) - Unspecified injury of left Achilles tendon, subsequent encounter    Insurance  Insurance reviewed  Name of Insurance:  [facility]  Visit No.  2   S/P LEFT ACHILLES REPAIR S86.002A 0% COINSUR/ 5000/10,000 DED / 6900/13,800 OOP / 40V CY / PA IS NOT REQ PER [employer] REF#: [id number] // Iris confirmed 3/21/24 4:02pm     Subjective:  Patient reports he follows up with MD this coming Monday.    Precautions  STEADI Fall Risk Score (The score of 4 or more indicates an increased risk of falling): 9  LE Weight Bearing Status:  (WBAT L LE with boot on)  Medical Precautions:  (Back surgery x 8 (last one in ), spinal stimulator, R knee surgery x 3, L knee surgery x 2, R  shoulder surgery x 1, Brain surgery 2013, L foot amputation, 22 nodules L lung, HTN, Lower back pain)  Post-Surgical Precautions:  (Excision of Calcaneal Spur (95865 (CPT®)) Left Consult  Secondary Repair of Achilles (82309 (CPT®)) Left Consult  C-arm, KINSEY 4400 SMALL BATTERY, ARTHREX: SPEED BRIDGE - (prone)    Paring of Callus, EXCISION OF SOFT TISSUE MASS performed on 1/10/2024)  Precautions Comment: See script    Pain:  Pain Assessment  Pain Assessment: 0-10  Pain Score: 7  Pain Type: Surgical pain  Pain Location: Ankle  Pain Orientation: Left  Weightbearing Status:  WBAT L LE with boot on        Treatments    Manual Therapy  Manual Therapy Performed: Yes  Manual Therapy Activity 1: PROM L ankle all planes in prone    Modalities  Modalities Used: Yes  Modality 1: Timed Ultrasound (US, 1 MHZ, 1.1 w/cm2, 100%, 8 minutes, L lateral ankle)    Treatment  Time in clinic started at:   9:15am  Time in clinic ended at:   9:45am  Total time in clinic is:   30 minutes  Total timed code time is:  25 minutes    Treatment Performed Today:.   Manual therapy x 1 unit  US x 1 unit

## 2024-04-01 ENCOUNTER — TREATMENT (OUTPATIENT)
Dept: PHYSICAL THERAPY | Facility: CLINIC | Age: 39
End: 2024-04-01
Payer: COMMERCIAL

## 2024-04-01 DIAGNOSIS — S86.002D ACHILLES TENDON INJURY, LEFT, SUBSEQUENT ENCOUNTER: ICD-10-CM

## 2024-04-01 PROCEDURE — 97140 MANUAL THERAPY 1/> REGIONS: CPT | Mod: GP,CQ

## 2024-04-01 PROCEDURE — 97035 APP MDLTY 1+ULTRASOUND EA 15: CPT | Mod: GP,CQ

## 2024-04-01 ASSESSMENT — PAIN - FUNCTIONAL ASSESSMENT: PAIN_FUNCTIONAL_ASSESSMENT: 0-10

## 2024-04-01 ASSESSMENT — PAIN SCALES - GENERAL: PAINLEVEL_OUTOF10: 7

## 2024-04-01 NOTE — PROGRESS NOTES
PHYSICAL THERAPY TREATMENT    Patient name:  Lacho Calix    MRN:  [id number]    :  1985    Today's Date:  24    Time Calculation  Start Time: 1148  Stop Time: 1018  Time Calculation (min): 1350 min     PT Therapeutic Procedures Time Entry  Manual Therapy Time Entry: 17  PT Modalities Time Entry  Ultrasound Time Entry: 8    Referral by:  Referred By: Dr. Amanuel Nieves    Diagnoses:       Assessment/Plan:  Manual therapy performed to improve L ankle mobility and reduce overall stiffness/pain and swelling.  US performed to reduce L ankle inflammation/pain. No skin abnormalities noted following modality.    PT Assessment  PT Assessment Results: Decreased strength, Decreased range of motion, Decreased endurance, Impaired balance, Decreased mobility, Orthopedic restrictions, Pain  OP PT Plan  PT Plan: Skilled PT    General Visit Information       General  Referred By: Dr. Amanuel Nieves  General Comment: Diagnosis  S86.002D (ICD-10-CM) - Unspecified injury of left Achilles tendon, subsequent encounter    Insurance  Insurance reviewed  Name of Insurance:  [facility]  Visit No.  2   S/P LEFT ACHILLES REPAIR S86.002A 0% COINSUR/ 5000/10,000 DED / 6900/13,800 OOP / 40V CY / PA IS NOT REQ PER [employer] REF#: [id number] // Iris confirmed 3/21/24 4:02pm     Subjective:  Patient reports he follows up with MD this afternoon. He reports 7/10 pain in left ankle at rest and states pain increases with weightbearing.     Precautions  STEADI Fall Risk Score (The score of 4 or more indicates an increased risk of falling): 9  LE Weight Bearing Status:  (WBAT L LE with boot on)  Medical Precautions:  (Back surgery x 8 (last one in 2019), spinal stimulator, R knee surgery x 3, L knee surgery x 2, R shoulder surgery x 1, Brain surgery 2013, L foot amputation, 22 nodules L lung, HTN, Lower back pain)  Post-Surgical Precautions:  (Excision of Calcaneal Spur (16311 (CPT®)) Left Consult  Secondary Repair of Achilles  (89680 (CPT®)) Left Consult  C-arm, KINSEY 4400 SMALL BATTERY, ARTHREX: SPEED BRIDGE - (prone)    Paring of Callus, EXCISION OF SOFT TISSUE MASS performed on 1/10/2024)  Precautions Comment: See script    Pain:  Pain Assessment  Pain Assessment: 0-10  Pain Score: 7  Pain Type: Surgical pain  Pain Location: Ankle  Pain Orientation: Left  Weightbearing Status:  WBAT L LE with boot on        Treatments     PROM L ankle all planes in prone  Gentle retrograde massage to decrease edema (17 min)    US 1 MHZ 1.1 w/cm2, 100% x 8 min L lateral ankle (8 min)         Treatment  Time in clinic started at:   11:48 am  Time in clinic ended at:   10:18 am  Total time in clinic is:   30 minutes  Total timed code time is:   25 minutes    Treatment Performed Today:.   Manual therapy x 1 unit  US x 1 unit

## 2024-04-03 ENCOUNTER — APPOINTMENT (OUTPATIENT)
Dept: PHYSICAL THERAPY | Facility: CLINIC | Age: 39
End: 2024-04-03
Payer: COMMERCIAL

## 2024-04-09 ENCOUNTER — OFFICE VISIT (OUTPATIENT)
Dept: PRIMARY CARE | Facility: CLINIC | Age: 39
End: 2024-04-09
Payer: COMMERCIAL

## 2024-04-09 VITALS — SYSTOLIC BLOOD PRESSURE: 130 MMHG | DIASTOLIC BLOOD PRESSURE: 84 MMHG | TEMPERATURE: 97.6 F

## 2024-04-09 DIAGNOSIS — M77.32 BONE SPUR OF INFERIOR PORTION OF LEFT CALCANEUS: ICD-10-CM

## 2024-04-09 DIAGNOSIS — H65.112 NON-RECURRENT ACUTE ALLERGIC OTITIS MEDIA OF LEFT EAR: ICD-10-CM

## 2024-04-09 DIAGNOSIS — M76.62 ACHILLES TENDINITIS OF LEFT LOWER EXTREMITY: Primary | ICD-10-CM

## 2024-04-09 PROCEDURE — 1036F TOBACCO NON-USER: CPT | Performed by: FAMILY MEDICINE

## 2024-04-09 PROCEDURE — 99213 OFFICE O/P EST LOW 20 MIN: CPT | Performed by: FAMILY MEDICINE

## 2024-04-09 RX ORDER — AMOXICILLIN AND CLAVULANATE POTASSIUM 875; 125 MG/1; MG/1
875 TABLET, FILM COATED ORAL 2 TIMES DAILY
Qty: 20 TABLET | Refills: 0 | Status: SHIPPED | OUTPATIENT
Start: 2024-04-09 | End: 2024-04-19

## 2024-04-09 RX ORDER — HYDROCODONE BITARTRATE AND ACETAMINOPHEN 5; 325 MG/1; MG/1
1 TABLET ORAL EVERY 6 HOURS PRN
Qty: 70 TABLET | Refills: 0 | Status: SHIPPED | OUTPATIENT
Start: 2024-04-09 | End: 2024-04-30 | Stop reason: SDUPTHER

## 2024-04-09 ASSESSMENT — ENCOUNTER SYMPTOMS
LOSS OF SENSATION: 1
TINGLING: 1
LOSS OF MOTION: 1
MUSCLE WEAKNESS: 1
DEPRESSION: 0
INABILITY TO BEAR WEIGHT: 1

## 2024-04-09 ASSESSMENT — PATIENT HEALTH QUESTIONNAIRE - PHQ9
1. LITTLE INTEREST OR PLEASURE IN DOING THINGS: NOT AT ALL
SUM OF ALL RESPONSES TO PHQ9 QUESTIONS 1 AND 2: 0
2. FEELING DOWN, DEPRESSED OR HOPELESS: NOT AT ALL

## 2024-04-09 NOTE — PROGRESS NOTES
"Subjective   Patient ID: [id number]     Lacho Calix is a 38 y.o. male who presents for Follow-up and ear popping (Left ear popping and drainage.).  Lower Extremity Issue  The symptoms are aggravated by movement, palpation and weight bearing.     He still has left leg pain.  The SCS was finally restarted after five years.  It has to be adjusted.    \"Hopefully this is the last time he needs the pain medication.\"    He is still in the boot since January and he has four more months.      He has popping in the ear.  Left ear pain.    No sore throat.  Mild runny nose.  Eyes have been watering.  This has been for a couple of weeks.      He has an EMG scheduled.        Objective     /84 (BP Location: Left arm, Patient Position: Sitting)   Temp 36.4 °C (97.6 °F) (Skin)      Physical Exam  Constitutional:       Appearance: Normal appearance.   HENT:      Ears:      Comments: left ear TM red.             Nose: No congestion or rhinorrhea.      Mouth/Throat:      Pharynx: No oropharyngeal exudate or posterior oropharyngeal erythema.   Cardiovascular:      Rate and Rhythm: Normal rate and regular rhythm.   Musculoskeletal:      Comments: Left foot in boot.  Uses cane.   Neurological:      Mental Status: He is alert.         Assessment/Plan   Problem List Items Addressed This Visit    None  Visit Diagnoses       Achilles tendinitis of left lower extremity    -  Primary    Relevant Medications    HYDROcodone-acetaminophen (Norco) 5-325 mg tablet    Bone spur of inferior portion of left calcaneus        Relevant Medications    HYDROcodone-acetaminophen (Norco) 5-325 mg tablet    Non-recurrent acute allergic otitis media of left ear        Relevant Medications    amoxicillin-pot clavulanate (Augmentin) 875-125 mg tablet            Addi Mendenhall,    Answers submitted by the patient for this visit:  Lower Extremity Injury Questionnaire (Submitted on 4/9/2024)  Chief Complaint: Lower extremity pain  Incident occurred: " more than 1 week ago  Incident location: other  Injury mechanism: unknown  Pain location: left heel  Pain quality: aching, burning, cramping, shooting, stabbing  Pain - numeric: 7/10  Pain course: constant  tingling: Yes  inability to bear weight: Yes  loss of motion: Yes  loss of sensation: Yes  muscle weakness: Yes  Foreign body present: no foreign bodies  I refilled your pain medication and ordered antibiotics for an ear infection.  Follow up with the EMG and the adjustment of your spinal cord stimulator.  Return in three weeks for a recheck if needed.

## 2024-04-09 NOTE — PATIENT INSTRUCTIONS
I refilled your pain medication and ordered antibiotics for an ear infection.  Follow up with the EMG and the adjustment of your spinal cord stimulator.  Return in three weeks for a recheck if needed.

## 2024-04-12 ENCOUNTER — TREATMENT (OUTPATIENT)
Dept: PHYSICAL THERAPY | Facility: CLINIC | Age: 39
End: 2024-04-12
Payer: COMMERCIAL

## 2024-04-12 DIAGNOSIS — S86.002D ACHILLES TENDON INJURY, LEFT, SUBSEQUENT ENCOUNTER: ICD-10-CM

## 2024-04-12 PROCEDURE — 97140 MANUAL THERAPY 1/> REGIONS: CPT | Mod: GP | Performed by: PHYSICAL THERAPIST

## 2024-04-12 PROCEDURE — 97016 VASOPNEUMATIC DEVICE THERAPY: CPT | Mod: GP | Performed by: PHYSICAL THERAPIST

## 2024-04-12 ASSESSMENT — PAIN - FUNCTIONAL ASSESSMENT: PAIN_FUNCTIONAL_ASSESSMENT: 0-10

## 2024-04-12 ASSESSMENT — PAIN SCALES - GENERAL: PAINLEVEL_OUTOF10: 7

## 2024-04-12 NOTE — PROGRESS NOTES
PHYSICAL THERAPY TREATMENT    Patient name:  Lacho Calix    MRN:  [id number]    :  1985    Today's Date:  24    Time Calculation  Start Time: 1000  Stop Time: 1045  Time Calculation (min): 45 min     PT Therapeutic Procedures Time Entry  Manual Therapy Time Entry: 23  PT Modalities Time Entry  Vasopneumatic Devices Time Entry: 15    Referral by:  Referred By: Dr. Amanuel Nieves    Diagnoses:  Diagnosis and Precautions: Diagnosis S86.002D (ICD-10-CM) - Unspecified injury of left Achilles tendon, subsequent encounter    Assessment/Plan:  Manual therapy performed to improve L ankle mobility and reduce overall stiffness/pain.  Game Ready performed to reduce L ankle inflammation/pain.   Patient educated as to benefits of soft tissue mobilization/instrument assisted soft tissue mobilization which includes decreased pain, improved soft tissue mobility/ROM/strength, reduced scar tissue, increased vascular response, allows for faster rehab time/recovery, patients can continue to engage in functional activities, and resolves chronic conditions thought to be permanent.  Patient educated as to they may experience redness or even possible bruising after completion of soft tissue work/instrument assisted soft tissue mobilization.  Patient encouraged to ice 2-3x/day for the next 2-3 days upon completion of treatment secondary to possible muscle/joint soreness.   PT Assessment  PT Assessment Results: Decreased strength, Decreased range of motion, Decreased endurance, Impaired balance, Decreased mobility, Orthopedic restrictions, Pain  Rehab Prognosis: Good  Evaluation/Treatment Tolerance: Patient limited by pain  OP PT Plan  PT Plan: Skilled PT  Rehab Potential: Good  Plan of Care Agreement: Patient    General Visit Information  PT  Visit  PT Received On: 24    General  Reason for Referral: PT Evaluate and Treat  Referred By: Dr. Amanuel Nieves  General Comment: Diagnosis  S86.002D (ICD-10-CM) - Unspecified  injury of left Achilles tendon, subsequent encounter    Insurance  Insurance reviewed  Name of Insurance:  [facility]  Visit No.  4   S/P LEFT ACHILLES REPAIR S86.002A 0% COINSUR/ 5000/10,000 DED / 6900/13,800 OOP / 40V CY / PA IS NOT REQ PER [employer] REF#: [id number] // Iris confirmed 3/21/24 4:02pm     Subjective:  Patient reports he follows up with MD in 2 weeks.    Precautions  STEADI Fall Risk Score (The score of 4 or more indicates an increased risk of falling): 9  LE Weight Bearing Status:  (WBAT L LE with boot on)  Medical Precautions:  (Back surgery x 8 (last one in 2019), spinal stimulator, R knee surgery x 3, L knee surgery x 2, R shoulder surgery x 1, Brain surgery 2013, L foot amputation, 22 nodules L lung, HTN, Lower back pain)  Post-Surgical Precautions:  (Excision of Calcaneal Spur (64346 (CPT®)) Left Consult  Secondary Repair of Achilles (98712 (CPT®)) Left Consult  C-arm, KINSEY 4400 SMALL BATTERY, ARTHREX: SPEED BRIDGE - (prone)    Paring of Callus, EXCISION OF SOFT TISSUE MASS performed on 1/10/2024)  Precautions Comment: See script    Pain:  Pain Assessment  Pain Assessment: 0-10  Pain Score: 7  Pain Type: Surgical pain  Pain Location: Ankle  Pain Orientation: Left  Weightbearing Status:  WBAT L LE with boot on        Treatments    Manual Therapy  Manual Therapy Performed: Yes  Manual Therapy Activity 1: PROM L ankle all planes in prone  Manual Therapy Activity 2: soft tissue work L ankle/calf    Modalities  Modalities Used: Yes  Modality 1: Untimed Vasopneumatic (Game Ready x 15 minutes L ankle)    Treatment  Time in clinic started at:   10am  Time in clinic ended at:   10:45am  Total time in clinic is:   45 minutes  Total timed code time is:  38 minutes    Treatment Performed Today:.   Manual therapy x 2 units  Vasopneumatic compression x 1 unit

## 2024-04-15 ENCOUNTER — TREATMENT (OUTPATIENT)
Dept: PHYSICAL THERAPY | Facility: CLINIC | Age: 39
End: 2024-04-15
Payer: COMMERCIAL

## 2024-04-15 DIAGNOSIS — S86.002D UNSPECIFIED INJURY OF LEFT ACHILLES TENDON, SUBSEQUENT ENCOUNTER: ICD-10-CM

## 2024-04-15 DIAGNOSIS — S86.002D ACHILLES TENDON INJURY, LEFT, SUBSEQUENT ENCOUNTER: Primary | ICD-10-CM

## 2024-04-15 PROBLEM — S86.002A: Status: ACTIVE | Noted: 2024-03-25

## 2024-04-15 PROCEDURE — 97016 VASOPNEUMATIC DEVICE THERAPY: CPT | Mod: GP,CQ

## 2024-04-15 PROCEDURE — 97110 THERAPEUTIC EXERCISES: CPT | Mod: GP,CQ

## 2024-04-15 PROCEDURE — 97140 MANUAL THERAPY 1/> REGIONS: CPT | Mod: GP,CQ

## 2024-04-15 ASSESSMENT — PAIN - FUNCTIONAL ASSESSMENT: PAIN_FUNCTIONAL_ASSESSMENT: 0-10

## 2024-04-15 ASSESSMENT — PAIN SCALES - GENERAL: PAINLEVEL_OUTOF10: 6

## 2024-04-15 NOTE — PROGRESS NOTES
"PHYSICAL THERAPY TREATMENT    Patient name:  Lacho Calix    MRN:  [id number]    :  1985    Today's Date:  04/15/24    Time Calculation  Start Time: 1107  Stop Time: 1210  Time Calculation (min): 63 min     PT Therapeutic Procedures Time Entry  Manual Therapy Time Entry: 30  Therapeutic Exercise Time Entry: 9  PT Modalities Time Entry  Vasopneumatic Devices Time Entry: 15    Referral by:  Referred By: Dr. Amanuel Nieves    Diagnoses:  Diagnosis and Precautions: Diagnosis S86.002D (ICD-10-CM) - Unspecified injury of left Achilles tendon, subsequent encounter    Assessment/Plan:  Manual therapy performed to improve L ankle mobility and reduce overall stiffness/pain. Game Ready performed to reduce L ankle inflammation/pain.  No skin abnormality noted after modalities. Pt reports ~1 point less pain after tx. Pt sees Dr in 2 weeks. Patient would benefit from P.T. to continue to address impairments in order to improve strength, flexibility, posture, and  to decrease symptoms and increase overall function.  Increase ther ex NV.  PT Assessment  PT Assessment Results: Decreased strength, Decreased range of motion, Decreased endurance, Impaired balance, Decreased mobility, Orthopedic restrictions, Pain  Rehab Prognosis: Good  Evaluation/Treatment Tolerance: Patient limited by pain  OP PT Plan  PT Plan: Skilled PT    General Visit Information       General  Reason for Referral: PT Evaluate and Treat  Referred By: Dr. Amanuel Nieves  General Comment: Diagnosis  S86.002D (ICD-10-CM) - Unspecified injury of left Achilles tendon, subsequent encounter    Insurance  Insurance reviewed  Name of Insurance:  [facility]  Visit No.     S/P LEFT ACHILLES REPAIR S86.002A 0% COINSUR/ 5000/10,000 DED / 6900/13,800 OOP / 40V CY / PA IS NOT REQ PER [employer] REF#: [id number] // Iris confirmed 3/21/24 4:02pm     Subjective:  Patient reports he follows up with MD in 2 weeks. \"It's more swollen " "today.\"    Precautions   Precautions  STEADI Fall Risk Score (The score of 4 or more indicates an increased risk of falling): 9  LE Weight Bearing Status:  (WBAT L LE with boot on)  Medical Precautions:  (Back surgery x 8 (last one in 2019), spinal stimulator, R knee surgery x 3, L knee surgery x 2, R shoulder surgery x 1, Brain surgery 2013, L foot amputation, 22 nodules L lung, HTN, Lower back pain)  Post-Surgical Precautions:  (Excision of Calcaneal Spur (22149 (CPT®)) Left Consult  Secondary Repair of Achilles (09032 (CPT®)) Left Consult  C-arm, KINSEY 4400 SMALL BATTERY, ARTHREX: SPEED BRIDGE - (prone)    Paring of Callus, EXCISION OF SOFT TISSUE MASS performed on 1/10/2024)  Precautions Comment: See script Weightbearing Status:  WBAT L LE with boot on.    Pain:  Pain Assessment  Pain Assessment: 0-10  Pain Score: 6        Objective:  Amb in to clinic w/ boot.  L ankle DF AROM= 2 (before), 5 (after manual).    Treatments   Seated ankle alphabet x1  Ankle DF vs GTB 2x20  Gentle GS Stretch w/ towel 3x30\"    Manual:   PROM L ankle all planes in prone. Gentle retrograde massage to decrease edema. STM to GS musc and achilles/ calcaneus.    Modalities:  GameReady to L foot at 34 degrees at medium compression (L LE elevated).               HEP Issued and reviewed written HEP of:   Access Code: [email or web address]  Date: 03/25/2024     Exercises  - Towel Scrunches  - 1 x daily - 4-5 x weekly - 2 sets - 10 reps - 5-10 hold  - Seated Ankle Alphabet  - 1 x daily - 4-5 x weekly - 1 sets - 2 reps - 2 hold  - Seated Ankle Circles  - 1 x daily - 4-5 x weekly - 2 sets - 10 reps - 5-10 hold  - Toe Spreading  - 1 x daily - 4-5 x weekly - 2 sets - 10 reps - 5-10 hold  - Supine Ankle Inversion Eversion AROM  - 1 x daily - 4-5 x weekly - 2 sets - 10 reps - 5-10 hold  - Supine Ankle Dorsiflexion and Plantarflexion AROM  - 1 x daily - 4-5 x weekly - 2 sets - 10 reps - 5-10 hold  - Isometric Ankle Eversion at Wall  - 1 x daily - 3-4 x weekly - " 1 sets - 10 reps - 10 hold  - Isometric Ankle Inversion at Wall  - 1 x daily - 3-4 x weekly - 1 sets - 10 reps - 10 hold  - Long Sitting Isometric Ankle Plantarflexion with Ball at Wall  - 1 x daily - 3-4 x weekly - 1 sets - 10 reps - 10 hold  - Isometric Ankle Dorsiflexion and Plantarflexion

## 2024-04-17 ENCOUNTER — APPOINTMENT (OUTPATIENT)
Dept: PHYSICAL THERAPY | Facility: CLINIC | Age: 39
End: 2024-04-17
Payer: COMMERCIAL

## 2024-04-22 ENCOUNTER — TREATMENT (OUTPATIENT)
Dept: PHYSICAL THERAPY | Facility: CLINIC | Age: 39
End: 2024-04-22
Payer: COMMERCIAL

## 2024-04-22 DIAGNOSIS — S86.002D ACHILLES TENDON INJURY, LEFT, SUBSEQUENT ENCOUNTER: ICD-10-CM

## 2024-04-22 PROCEDURE — 97016 VASOPNEUMATIC DEVICE THERAPY: CPT | Mod: GP,CQ

## 2024-04-22 PROCEDURE — 97140 MANUAL THERAPY 1/> REGIONS: CPT | Mod: GP,CQ

## 2024-04-22 PROCEDURE — 97110 THERAPEUTIC EXERCISES: CPT | Mod: GP,CQ

## 2024-04-22 ASSESSMENT — PAIN - FUNCTIONAL ASSESSMENT: PAIN_FUNCTIONAL_ASSESSMENT: 0-10

## 2024-04-22 ASSESSMENT — PAIN SCALES - GENERAL: PAINLEVEL_OUTOF10: 7

## 2024-04-22 NOTE — PROGRESS NOTES
PHYSICAL THERAPY TREATMENT    Patient name:  Lacho Calix    MRN:  [id number]    :  1985    Today's Date:  24    Time Calculation  Start Time: 1100  Stop Time: 1200  Time Calculation (min): 60 min     PT Therapeutic Procedures Time Entry  Manual Therapy Time Entry: 15  Therapeutic Exercise Time Entry: 20  PT Modalities Time Entry  Vasopneumatic Devices Time Entry: 15    Referral by:  Referred By: Dr. Amanuel Nieves    Diagnoses:   Diagnosis and Precautions: Diagnosis S86.002D (ICD-10-CM) - Unspecified injury of left Achilles tendon, subsequent encounter     Assessment/Plan:  Manual therapy performed to improve L ankle mobility and reduce overall stiffness/pain. Game Ready performed to reduce L ankle inflammation/pain.  No skin abnormality noted after modalities. Slight decrease in swelling noted at end of session. He also reported slight decrease in pain to 5.5/10. Patient would benefit from P.T. to continue to address impairments in order to improve strength, flexibility, posture, and body mechanics to decrease symptoms and increase overall function.   PT Assessment  PT Assessment Results: Decreased strength, Decreased range of motion, Decreased endurance, Impaired balance, Decreased mobility, Orthopedic restrictions, Pain  Rehab Prognosis: Good  OP PT Plan  PT Plan: Skilled PT    General Visit Information       General  Referred By: Dr. Amanuel Nieves  General Comment: Diagnosis  S86.002D (ICD-10-CM) - Unspecified injury of left Achilles tendon, subsequent encounter    Insurance  Insurance reviewed  Name of Insurance:  [facility]  Visit No.     S/P LEFT ACHILLES REPAIR S86.002A 0% COINSUR/ 5000/10,000 DED / 6900/13,800 OOP / 40V CY / PA IS NOT REQ PER [employer] REF#: [id number] // Iris confirmed 3/21/24 4:02pm     Subjective:  Patient reports he follows up with MD next week. He states that his left ankle has been more swollen since last week. Still having 7/10 pain.     Precautions  "  Precautions  STEADI Fall Risk Score (The score of 4 or more indicates an increased risk of falling): 9  LE Weight Bearing Status:  (WBAT L LE with boot on)  Medical Precautions:  (Back surgery x 8 (last one in 2019), spinal stimulator, R knee surgery x 3, L knee surgery x 2, R shoulder surgery x 1, Brain surgery 2013, L foot amputation, 22 nodules L lung, HTN, Lower back pain)  Post-Surgical Precautions:  (Excision of Calcaneal Spur (87238 (CPT®)) Left Consult  Secondary Repair of Achilles (64361 (CPT®)) Left Consult  C-arm, KINSEY 4400 SMALL BATTERY, ARTHREX: SPEED BRIDGE - (prone)    Paring of Callus, EXCISION OF SOFT TISSUE MASS performed on 1/10/2024)  Precautions Comment: See script     Pain:  Pain Assessment  Pain Assessment: 0-10  Pain Score: 7        Objective:  Amb in to clinic w/ boot.  Swelling noted left medial/lateral ankle and dorsum of foot      Treatments  (20')  Seated ankle alphabet x1  Gentle GS Stretch w/ towel 3x30\"  SL hip abduction x 15   Clamshells  x 15  Manual:  (25')   PROM L ankle all planes in prone. Gentle retrograde massage to decrease edema. STM to GS musc and achilles/ calcaneus.    Modalities:  GameReady to L foot at 34 degrees at medium compression (L LE elevated)  (15')              HEP Issued and reviewed written HEP of:   Access Code: [email or web address]  Date: 03/25/2024     Exercises  - Towel Scrunches  - 1 x daily - 4-5 x weekly - 2 sets - 10 reps - 5-10 hold  - Seated Ankle Alphabet  - 1 x daily - 4-5 x weekly - 1 sets - 2 reps - 2 hold  - Seated Ankle Circles  - 1 x daily - 4-5 x weekly - 2 sets - 10 reps - 5-10 hold  - Toe Spreading  - 1 x daily - 4-5 x weekly - 2 sets - 10 reps - 5-10 hold  - Supine Ankle Inversion Eversion AROM  - 1 x daily - 4-5 x weekly - 2 sets - 10 reps - 5-10 hold  - Supine Ankle Dorsiflexion and Plantarflexion AROM  - 1 x daily - 4-5 x weekly - 2 sets - 10 reps - 5-10 hold  - Isometric Ankle Eversion at Wall  - 1 x daily - 3-4 x weekly - 1 sets - 10 reps - " 10 hold  - Isometric Ankle Inversion at Wall  - 1 x daily - 3-4 x weekly - 1 sets - 10 reps - 10 hold  - Long Sitting Isometric Ankle Plantarflexion with Ball at Wall  - 1 x daily - 3-4 x weekly - 1 sets - 10 reps - 10 hold  - Isometric Ankle Dorsiflexion and Plantarflexion

## 2024-04-24 ENCOUNTER — TREATMENT (OUTPATIENT)
Dept: PHYSICAL THERAPY | Facility: CLINIC | Age: 39
End: 2024-04-24
Payer: COMMERCIAL

## 2024-04-24 DIAGNOSIS — S86.002D ACHILLES TENDON INJURY, LEFT, SUBSEQUENT ENCOUNTER: ICD-10-CM

## 2024-04-24 DIAGNOSIS — S86.002A UNSPECIFIED INJURY OF LEFT ACHILLES TENDON, INITIAL ENCOUNTER: Primary | ICD-10-CM

## 2024-04-24 PROCEDURE — 97110 THERAPEUTIC EXERCISES: CPT | Mod: GP | Performed by: PHYSICAL THERAPIST

## 2024-04-24 PROCEDURE — 97016 VASOPNEUMATIC DEVICE THERAPY: CPT | Mod: GP | Performed by: PHYSICAL THERAPIST

## 2024-04-24 ASSESSMENT — PAIN SCALES - GENERAL: PAINLEVEL_OUTOF10: 7

## 2024-04-24 ASSESSMENT — PAIN - FUNCTIONAL ASSESSMENT: PAIN_FUNCTIONAL_ASSESSMENT: 0-10

## 2024-04-24 NOTE — PROGRESS NOTES
PHYSICAL THERAPY TREATMENT    Patient name:  Lacho Calix    MRN:  [id number]    :  1985    Today's Date:  24    Time Calculation  Start Time: 1130  Stop Time: 1220  Time Calculation (min): 50 min     PT Therapeutic Procedures Time Entry  Therapeutic Exercise Time Entry: 23  PT Modalities Time Entry  Vasopneumatic Devices Time Entry: 15    Referral by:  Referred By: Dr. Amanuel Nieves    Diagnoses:  Diagnosis and Precautions: Diagnosis S86.002D (ICD-10-CM) - Unspecified injury of left Achilles tendon, subsequent encounter    Assessment/Plan:   Game Ready performed to reduce L ankle inflammation/pain.   Therapeutic exercise performed in order to improve L ankle strength/ROM/mobility.  Patient requires increased tactile/verbal cues with exercise in order to reduce L ankle stress/strain during the particular exercise performed.  Patient challenged with exercises performed in clinic secondary to L ankle fatigue.   PT Assessment  PT Assessment Results: Decreased strength, Decreased range of motion, Decreased endurance, Impaired balance, Decreased mobility, Orthopedic restrictions, Pain  Rehab Prognosis: Good  OP PT Plan  PT Plan: Skilled PT  Rehab Potential: Good  Plan of Care Agreement: Patient    General Visit Information  PT  Visit  PT Received On: 24    General  Reason for Referral: PT Evaluate and Treat  Referred By: Dr. Amanuel Nieves  General Comment: Diagnosis  S86.002D (ICD-10-CM) - Unspecified injury of left Achilles tendon, subsequent encounter    Insurance  Insurance reviewed  Name of Insurance:  [facility]  Visit No.     S/P LEFT ACHILLES REPAIR S86.002A 0% COINSUR/ 5000/10,000 DED / 6900/13,800 OOP / 40V CY / PA IS NOT REQ PER [employer] REF#: [id number] // Iris confirmed 3/21/24 4:02pm     Subjective:  Patient reports he follows up with MD this coming 2024.    Precautions  STEADI Fall Risk Score (The score of 4 or more indicates an increased risk of falling):  "9  LE Weight Bearing Status:  (WBAT L LE with boot on)  Medical Precautions:  (Back surgery x 8 (last one in 2019), spinal stimulator, R knee surgery x 3, L knee surgery x 2, R shoulder surgery x 1, Brain surgery 2013, L foot amputation, 22 nodules L lung, HTN, Lower back pain)  Post-Surgical Precautions:  (Excision of Calcaneal Spur (57378 (CPT®)) Left Consult  Secondary Repair of Achilles (43755 (CPT®)) Left Consult  C-arm, KINSEY 4400 SMALL BATTERY, ARTHREX: SPEED BRIDGE - (prone)    Paring of Callus, EXCISION OF SOFT TISSUE MASS performed on 1/10/2024)  Precautions Comment: See script    Pain:  Pain Assessment  Pain Assessment: 0-10  Pain Score: 7  Pain Type: Surgical pain  Pain Location: Ankle  Pain Orientation: Left  Weightbearing Status:  WBAT L LE with boot on        Treatments    Therapeutic Exercise  Therapeutic Exercise Performed: Yes  Therapeutic Exercise Activity 1: Recumbent bike x 8 minutes, level 2 resistance  Therapeutic Exercise Activity 2: seated NWB soleus stretch 30\"x5  Therapeutic Exercise Activity 3: seated NWB gastroc stretch 30\"x5  Therapeutic Exercise Activity 4: TB ankle PRE's green x 30 reps each  Therapeutic Exercise Activity 5: towel scrunchies x 20 reps      Modalities  Modalities Used: Yes  Modality 1: Untimed Vasopneumatic (Game Ready L ankle x 15 minutes)    Treatment  Time in clinic started at:   11:30am  Time in clinic ended at:   12:20pm  Total time in clinic is:   50 minutes  Total timed code time is:  38 minutes    Treatment Performed Today:.   Therapeutic Exercise x 2 units  Vasopneumatic compression x 1 unit  "

## 2024-04-29 ENCOUNTER — TREATMENT (OUTPATIENT)
Dept: PHYSICAL THERAPY | Facility: CLINIC | Age: 39
End: 2024-04-29
Payer: COMMERCIAL

## 2024-04-29 DIAGNOSIS — S86.002D ACHILLES TENDON INJURY, LEFT, SUBSEQUENT ENCOUNTER: ICD-10-CM

## 2024-04-29 PROCEDURE — 97016 VASOPNEUMATIC DEVICE THERAPY: CPT | Mod: GP,CQ

## 2024-04-29 PROCEDURE — 97110 THERAPEUTIC EXERCISES: CPT | Mod: GP,CQ

## 2024-04-29 PROCEDURE — 97140 MANUAL THERAPY 1/> REGIONS: CPT | Mod: GP,CQ

## 2024-04-29 ASSESSMENT — PAIN - FUNCTIONAL ASSESSMENT: PAIN_FUNCTIONAL_ASSESSMENT: 0-10

## 2024-04-29 ASSESSMENT — PAIN SCALES - GENERAL: PAINLEVEL_OUTOF10: 7

## 2024-04-29 NOTE — PROGRESS NOTES
PHYSICAL THERAPY TREATMENT    Patient name:  Lacho Calix    MRN:  [id number]    :  1985    Today's Date:  24    Time Calculation  Start Time: 1145  Stop Time: 1245  Time Calculation (min): 60 min     PT Therapeutic Procedures Time Entry  Manual Therapy Time Entry: 25  Therapeutic Exercise Time Entry: 15  PT Modalities Time Entry  Vasopneumatic Devices Time Entry: 15    Referral by:  Referred By: Dr. Amanuel Nieves    Diagnoses:   Diagnosis S86.002D (ICD-10-CM) - Unspecified injury of left Achilles tendon, subsequent encounter     Assessment/Plan:   Game Ready performed to reduce L ankle inflammation/pain.  Decreased swelling noted in left foot/ankle following manual therapy/modality. Decreased trigger points also noted following manual therapy. Pt reported decreased pain to 5/10 at end of session. Pt would benefit from PT to continue to address impairments in order to improve strength, flexibility, gait, and body mechanics to decrease symptoms and increase overall function. OP PT Plan  PT Plan: Skilled PT    General Visit Information       General  Referred By: Dr. Amanuel Nieves  General Comment: Diagnosis  S86.002D (ICD-10-CM) - Unspecified injury of left Achilles tendon, subsequent encounter    Insurance  Insurance reviewed  Name of Insurance:  [facility]  Visit No.     S/P LEFT ACHILLES REPAIR S86.002A 0% COINSUR/ 5000/10,000 DED / 6900/13,800 OOP / 40V CY / PA IS NOT REQ PER [employer] REF#: [id number] // Iris confirmed 3/21/24 4:02pm     Subjective:    Pt reports increased pain for about 2 days after last session. He reports pain increased to 8/10 and he had more difficulty walking. He states that he has MD appointment on 24 at 1:30 pm.  Precautions  STEADI Fall Risk Score (The score of 4 or more indicates an increased risk of falling): 9  LE Weight Bearing Status:  (WBAT L LE with boot on)  Medical Precautions:  (Back surgery x 8 (last one in 2019), spinal stimulator, R knee  "surgery x 3, L knee surgery x 2, R shoulder surgery x 1, Brain surgery 2013, L foot amputation, 22 nodules L lung, HTN, Lower back pain)  Post-Surgical Precautions:  (Excision of Calcaneal Spur (35990 (CPT®)) Left Consult  Secondary Repair of Achilles (60115 (CPT®)) Left Consult  C-arm, KINSEY 4400 SMALL BATTERY, ARTHREX: SPEED BRIDGE - (prone)    Paring of Callus, EXCISION OF SOFT TISSUE MASS performed on 1/10/2024)  Objective:    Swelling noted left foot and ankle   Trigger points noted left gastroc   Pain:  Pain Assessment  Pain Assessment: 0-10  Pain Score: 7  Pain Location: Ankle  Pain Orientation: Left  Weightbearing Status:  WBAT L LE with boot on        Treatments    Therapeutic exercises:     (15')   seated gentle NWB soleus stretch 30\"x 3  seated gentle NWB gastroc stretch 30\"x 3  SL hip abduction x 15  Clamshells x 15   Manual:  (25')   PROM L ankle all planes in prone. Gentle retrograde massage to decrease edema. STM to GS musc and achilles     Modalities:  GameReady to L foot at 34 degrees at medium compression (L LE elevated)  (15')    Treatment  Time in clinic started at:  11:45 am  Time in clinic ended at:  12:45  pm  Total time in clinic is:   60 minutes  Total timed code time is:  40 minutes    Treatment Performed Today:.   Therapeutic Exercise x 1 units  Manual therapy x 2 units  Vasopneumatic compression x 1 unit  "

## 2024-04-30 ENCOUNTER — OFFICE VISIT (OUTPATIENT)
Dept: PRIMARY CARE | Facility: CLINIC | Age: 39
End: 2024-04-30
Payer: COMMERCIAL

## 2024-04-30 VITALS — DIASTOLIC BLOOD PRESSURE: 70 MMHG | SYSTOLIC BLOOD PRESSURE: 130 MMHG | TEMPERATURE: 97.8 F

## 2024-04-30 DIAGNOSIS — M76.62 ACHILLES TENDINITIS OF LEFT LOWER EXTREMITY: Primary | ICD-10-CM

## 2024-04-30 DIAGNOSIS — M77.32 BONE SPUR OF INFERIOR PORTION OF LEFT CALCANEUS: ICD-10-CM

## 2024-04-30 PROCEDURE — 1036F TOBACCO NON-USER: CPT | Performed by: FAMILY MEDICINE

## 2024-04-30 PROCEDURE — 99213 OFFICE O/P EST LOW 20 MIN: CPT | Performed by: FAMILY MEDICINE

## 2024-04-30 RX ORDER — HYDROCODONE BITARTRATE AND ACETAMINOPHEN 5; 325 MG/1; MG/1
1 TABLET ORAL EVERY 6 HOURS PRN
Qty: 70 TABLET | Refills: 0 | Status: SHIPPED | OUTPATIENT
Start: 2024-04-30 | End: 2024-05-18

## 2024-04-30 ASSESSMENT — ENCOUNTER SYMPTOMS
TINGLING: 1
INABILITY TO BEAR WEIGHT: 1
LOSS OF MOTION: 1
LOSS OF SENSATION: 0
MUSCLE WEAKNESS: 1

## 2024-04-30 NOTE — PROGRESS NOTES
Subjective   Patient ID: [id number]     Lacho Calix is a 38 y.o. male who presents for Follow-up and Med Refill.  Lower Extremity Issue  Nothing aggravates the symptoms.     He is here for a recheck on chronic pain.     His left leg is still in the walking cast.  He is in PT.  The therapist has found a ruptured or caught tendon.  It pops with excruciating pain.  He has an appointment with his surgeon on Monday.   He is waiting for an EMG that was ordered.     Checked OARRS.  No outside scripts.   Objective     /70 (BP Location: Left arm, Patient Position: Sitting)   Temp 36.6 °C (97.8 °F) (Skin)      Physical Exam  Musculoskeletal:      Comments: Tender at the left ankle.  In walking boot with a cane.   Neurological:      Mental Status: He is alert.       The SCS was shut off last week.  Causing pain in the right foot and not helping.  The pain in the right foot went away within minutes of turning it off.    The hydrocodone helps with with chronic pain.    Assessment/Plan   Problem List Items Addressed This Visit    None  Visit Diagnoses       Achilles tendinitis of left lower extremity    -  Primary    Relevant Medications    HYDROcodone-acetaminophen (Norco) 5-325 mg tablet    Bone spur of inferior portion of left calcaneus        Relevant Medications    HYDROcodone-acetaminophen (Norco) 5-325 mg tablet          I refilled your medication.  Return in one month for a recheck.  Follow up with orthopedics, PT and your spinal cord stimulator specialist.  Addi Mendenhall DO   Answers submitted by the patient for this visit:  Lower Extremity Injury Questionnaire (Submitted on 4/30/2024)  Chief Complaint: Lower extremity pain  Incident occurred: more than 1 week ago  Incident location: other  Injury mechanism: other  Pain location: left ankle  Pain quality: aching, burning, shooting, stabbing  Pain - numeric: 7/10  Pain course: constant  tingling: Yes  inability to bear weight: Yes  loss of motion: Yes  loss  of sensation: No  muscle weakness: Yes  Foreign body present: other

## 2024-05-01 ENCOUNTER — APPOINTMENT (OUTPATIENT)
Dept: PHYSICAL THERAPY | Facility: CLINIC | Age: 39
End: 2024-05-01
Payer: COMMERCIAL

## 2024-05-06 ENCOUNTER — APPOINTMENT (OUTPATIENT)
Dept: PHYSICAL THERAPY | Facility: CLINIC | Age: 39
End: 2024-05-06
Payer: COMMERCIAL

## 2024-05-13 ENCOUNTER — APPOINTMENT (OUTPATIENT)
Dept: PHYSICAL THERAPY | Facility: CLINIC | Age: 39
End: 2024-05-13
Payer: COMMERCIAL

## 2024-05-15 ENCOUNTER — TREATMENT (OUTPATIENT)
Dept: PHYSICAL THERAPY | Facility: CLINIC | Age: 39
End: 2024-05-15
Payer: COMMERCIAL

## 2024-05-15 DIAGNOSIS — S86.002A UNSPECIFIED INJURY OF LEFT ACHILLES TENDON, INITIAL ENCOUNTER: Primary | ICD-10-CM

## 2024-05-15 DIAGNOSIS — S86.002D ACHILLES TENDON INJURY, LEFT, SUBSEQUENT ENCOUNTER: ICD-10-CM

## 2024-05-15 PROCEDURE — 97110 THERAPEUTIC EXERCISES: CPT | Mod: GP | Performed by: PHYSICAL THERAPIST

## 2024-05-15 ASSESSMENT — PAIN - FUNCTIONAL ASSESSMENT: PAIN_FUNCTIONAL_ASSESSMENT: 0-10

## 2024-05-15 ASSESSMENT — PAIN SCALES - GENERAL: PAINLEVEL_OUTOF10: 7

## 2024-05-15 NOTE — LETTER
May 15, 2024    Umer Villalta, PT  [address]   [facility], [facility] [address]  [facility] [place] [address]    Patient: Lacho Calix   YOB: 1985   Date of Visit: 5/15/2024       Dear Amanuel Nieves DPM  [address] [facility] [address]  [place] [address]    The attached plan of care is being sent to you because your patient’s medical reimbursement requires that you certify the plan of care. Your signature is required to allow uninterrupted insurance coverage.      You may indicate your approval by signing below and faxing this form back to us at Dept Fax: [phone].    Please call Dept: [phone] with any questions or concerns.    Thank you for this referral,        Umer Villalta, PT  [place] [address] [facility]  [address] [facility] [address]    Payer: Payor: [employer] [place] / Plan: [employer] / Product Type: *No Product type* /                                                                         Date:     Dear Umer Villalta, PT,     Re: Mr. Lacho Calix, MRN:[id number]    I certify that I have reviewed the attached plan of care and it is medically necessary for Mr. Lacho Calix (1985) who is under my care.          ______________________________________                    _________________  Provider name and credentials                                           Date and time                                                                                           Plan of Care 5/15/24   Effective from: 5/15/2024  Effective to: 8/13/2024    Plan ID: [id number]            Participants as of Finalize on 5/15/2024    Name Type Comments Contact Info    Amanuel Nieves DPM Referring Provider  [phone]    Umer Villalta PT Physical Therapist  [phone]       Last Plan Note     Author: Umer Villalta PT Status: Incomplete Last edited: 5/15/2024 11:30 AM       PHYSICAL THERAPY TREATMENT    Patient name:   Lacho Calix    MRN:  [id number]    :  1985    Today's Date:  05/15/24    Time Calculation  Start Time: 1130  Stop Time: 1200  Time Calculation (min): 30 min     PT Therapeutic Procedures Time Entry  Therapeutic Exercise Time Entry: 20     Referral by:  Referred By: Dr. Amanuel Nieves    Diagnoses:  Diagnosis and Precautions: Diagnosis S86.002D (ICD-10-CM) - Unspecified injury of left Achilles tendon, subsequent encounter    Goals:   By the end of 18 visits patient will be able to do the following with < 1/10 L ANKLE/FOOT pain:    HEP:  Patient will consistently perform HER home exercise program for 20-30 minute sessions, 1-2x/day, 3-4 days/week independently by the end of 18 visits.  Slow progression    Basic ADL's:   Patient will perform bADL's/instrumental ADL's for 30 minutes moving between various closed kinetic chain postures.  Slow progression    ROM and Strength:  Patient will demonstrate 5/5 L ANKLE/FOOT strength in all planes and WNL's L ANKLE/FOOT AROM in all planes to improve their ability to lift, stand, ambulate and perform basic ADL's.  Slow progression    Stair Negotiation:  Patient will be able to ambulate up/down stairs for 1-2 flights at a time.  Slow progression    Gait/Locomotion:  Patient will be able to ambulate for 30-60 minutes at a time.  Slow progression  Minimal to no gait deviation across level ground/stairs.  Patient will demonstrate no L ANKLE/FOOT pain with the following movements:  partial squat, lunge, forward/lateral step-up, HR, stepdown and SLS.    WORK:  Patient will return to WORK and perform normal WORK activities pain free.  Not met    Sleep:  Patient will sleep thru the night 4/7 nights/week.  Slow progression    Participation restrictions:  Increase LEFS to > or = to 55/80 for increased functional ability.  Slow progression    Pain:  Decrease pain at worst to < or = to 1/10 for improved QOL and ability to sleep.  Slow progression    No point tenderness noted over  the L foot/ankle.  Slow progression    Assessment/Plan:   Patient comes into clinic today for PT Re-Evaluation secondary to being 18 weeks s/p Excision of Calcaneal Spur (22462 (CPT®)) Left Consult  Secondary Repair of Achilles (41316 (CPT®)) Left Consult  C-arm, KINSEY 4400 SMALL BATTERY, ARTHREX: SPEED BRIDGE - (prone)    Paring of Callus, EXCISION OF SOFT TISSUE MASS performed on 1/10/2024) by Dr. Amanuel Nieves.  Patient demonstrates very slow overall progress with regards to his L ankle ROM/strength/functional activity level due to elevated pain levels.  I am recommending that patient switch to aquatic therapy due to buoyancy of the water in reducing the weightbearing compressive forces/stress/strain applied to his L ankle and reducing overall pain.  Other unique properties include it may reduce inflammation and provide feedback for improving posture.  The resistance of the water during therapy provides a safe environment for addressing balance, strength, and postural deficits.  It also provides a comfortable and therapeutic medium in which to gain strength and endurance.  Patient will transfer to the [facility] for aquatic therapy with the following caveat:  Medical Clearance:  Patient needs to obtain medical clearance from the healthcare provider (Amanuel Damon from [facility]) who implanted the spinal stimulator before starting aquatic therapy due to patient having a spinal stimulator implant.  Once he receives medical clearance and provides it to us I will contact [place] and have them get in contact with him to schedule for aquatic therapy.  PT Assessment  PT Assessment Results: Decreased strength, Decreased range of motion, Decreased endurance, Impaired balance, Decreased mobility, Orthopedic restrictions, Pain  Rehab Prognosis: Good  OP PT Plan  Treatment/Interventions: Aquatic therapy, Cryotherapy, Education/ Instruction, Gait training, Manual therapy, Neuromuscular re-education, Self care/ home  management, Taping techniques, Therapeutic activities, Therapeutic exercises  PT Plan: Skilled PT  PT Frequency: Other (Comment) (1-2x/week)  Duration: 10 visits  Certification Period Start Date: 05/15/24  Certification Period End Date: 08/13/24  Number of Treatments Authorized: 10 visits  Rehab Potential: Good  Plan of Care Agreement: Patient    General Visit Information  PT  Visit  PT Received On: 05/15/24    General  Reason for Referral: PT Evaluate and Treat  Referred By: Dr. Amanuel Nieves  General Comment: Diagnosis  S86.002D (ICD-10-CM) - Unspecified injury of left Achilles tendon, subsequent encounter    Insurance  Insurance reviewed  Name of Insurance:  [facility]  Visit No.  9/18   S/P LEFT ACHILLES REPAIR S86.002A 0% COINSUR/ 5000/10,000 DED / 6900/13,800 OOP / 40V CY / PA IS NOT REQ PER [employer] REF#: [id number] // Iris confirmed 3/21/24 4:02pm     Subjective:  Patient comes into clinic today for PT Re-Evaluation secondary to being 18 weeks s/p Excision of Calcaneal Spur (70664 (CPT®)) Left Consult  Secondary Repair of Achilles (05083 (CPT®)) Left Consult  C-arm, KINSEY 4400 SMALL BATTERY, ARTHREX: SPEED BRIDGE - (prone)    Paring of Callus, EXCISION OF SOFT TISSUE MASS performed on 1/10/2024) by Dr. Amanuel Nieves.  Patient reports that his L Achilles pain is the same overall, but functionally he is feeling a little better.  Patient also reports having lower back nerve issues which he is getting addressed at this time.      Precautions  STEADI Fall Risk Score (The score of 4 or more indicates an increased risk of falling): 9  LE Weight Bearing Status:  (WBAT L LE with boot on)  Medical Precautions:  (Back surgery x 8 (last one in 2019), spinal stimulator, R knee surgery x 3, L knee surgery x 2, R shoulder surgery x 1, Brain surgery 2013, L foot amputation, 22 nodules L lung, HTN, Lower back pain)  Post-Surgical Precautions:  (Excision of Calcaneal Spur (99816 (CPT®)) Left Consult  Secondary  Repair of Achilles (83795 (CPT®)) Left Consult  C-arm, KINSEY 4400 SMALL BATTERY, ARTHREX: SPEED BRIDGE - (prone)    Paring of Callus, EXCISION OF SOFT TISSUE MASS performed on 1/10/2024)    Pain:  Pain Assessment  Pain Assessment: 0-10  Pain Score: 7  Pain Location: Ankle  Pain Orientation: Left    Objective:  Restrictions as per MD's Protocol if surgical:  see script    Weightbearing Status:  WBAT L LE with boot off    Skin:  L Achilles surgical incision healed and closed.    Palpation:   point tenderness over L Achilles incision    Sensation:  Patient denies numbness/tingling of bilateral LOWER extremities.    Gait:  mild antalgic gait L LE    ROM:  AROM  L ankle DF 5 degrees  PF 50 degrees with pain  Inversion 20 degrees with pain  Eversion WNL's    Strength:    L ankle 4-/5 all planes with pain,     Outcome measure  Lower Extremity Funtional Score (LEFS): 27/80          Treatments    Therapeutic Exercise  Therapeutic Exercise Performed: Yes  Therapeutic Exercise Activity 1: Re-Evaluation      Treatment  Time in clinic started at:   11:30am  Time in clinic ended at:   12:00pm  Total time in clinic is:   30 minutes  Total timed code time is:  20 minutes    Treatment Performed Today:.   Therapeutic Exercise x 1 unit         Current Participants as of 5/15/2024    Name Type Comments Contact Info    Amanuel Nieves DPM Referring Provider  [phone]    Signature pending    Umer Villalta PT Physical Therapist  [phone]    Signature pending

## 2024-05-15 NOTE — PROGRESS NOTES
PHYSICAL THERAPY TREATMENT    Patient name:  Lacho Calix    MRN:  [id number]    :  1985    Today's Date:  05/15/24    Time Calculation  Start Time: 1130  Stop Time: 1200  Time Calculation (min): 30 min     PT Therapeutic Procedures Time Entry  Therapeutic Exercise Time Entry: 20     Referral by:  Referred By: Dr. Amanuel Nieves    Diagnoses:  Diagnosis and Precautions: Diagnosis S86.002D (ICD-10-CM) - Unspecified injury of left Achilles tendon, subsequent encounter    Goals:   By the end of 18 visits patient will be able to do the following with < 1/10 L ANKLE/FOOT pain:    HEP:  Patient will consistently perform HER home exercise program for 20-30 minute sessions, 1-2x/day, 3-4 days/week independently by the end of 18 visits.  Slow progression    Basic ADL's:   Patient will perform bADL's/instrumental ADL's for 30 minutes moving between various closed kinetic chain postures.  Slow progression    ROM and Strength:  Patient will demonstrate 5/5 L ANKLE/FOOT strength in all planes and WNL's L ANKLE/FOOT AROM in all planes to improve their ability to lift, stand, ambulate and perform basic ADL's.  Slow progression    Stair Negotiation:  Patient will be able to ambulate up/down stairs for 1-2 flights at a time.  Slow progression    Gait/Locomotion:  Patient will be able to ambulate for 30-60 minutes at a time.  Slow progression  Minimal to no gait deviation across level ground/stairs.  Patient will demonstrate no L ANKLE/FOOT pain with the following movements:  partial squat, lunge, forward/lateral step-up, HR, stepdown and SLS.    WORK:  Patient will return to WORK and perform normal WORK activities pain free.  Not met    Sleep:  Patient will sleep thru the night 4/7 nights/week.  Slow progression    Participation restrictions:  Increase LEFS to > or = to 55/80 for increased functional ability.  Slow progression    Pain:  Decrease pain at worst to < or = to 1/10 for improved QOL and ability to sleep.  Slow  progression    No point tenderness noted over the L foot/ankle.  Slow progression    Assessment/Plan:   Patient comes into clinic today for PT Re-Evaluation secondary to being 18 weeks s/p Excision of Calcaneal Spur (85781 (CPT®)) Left Consult  Secondary Repair of Achilles (31998 (CPT®)) Left Consult  C-arm, KINSEY 4400 SMALL BATTERY, ARTHREX: SPEED BRIDGE - (prone)    Paring of Callus, EXCISION OF SOFT TISSUE MASS performed on 1/10/2024) by Dr. Amanuel Nieves.  Patient demonstrates very slow overall progress with regards to his L ankle ROM/strength/functional activity level due to elevated pain levels.  I am recommending that patient switch to aquatic therapy due to buoyancy of the water in reducing the weightbearing compressive forces/stress/strain applied to his L ankle and reducing overall pain.  Other unique properties include it may reduce inflammation and provide feedback for improving posture.  The resistance of the water during therapy provides a safe environment for addressing balance, strength, and postural deficits.  It also provides a comfortable and therapeutic medium in which to gain strength and endurance.  Patient will transfer to the [facility] for aquatic therapy with the following caveat:  Medical Clearance:  Patient needs to obtain medical clearance from the healthcare provider (Amanuel Damon from [facility]) who implanted the spinal stimulator before starting aquatic therapy due to patient having a spinal stimulator implant.  Once he receives medical clearance and provides it to us I will contact [facility] and have them get in contact with him to schedule for aquatic therapy.  PT Assessment  PT Assessment Results: Decreased strength, Decreased range of motion, Decreased endurance, Impaired balance, Decreased mobility, Orthopedic restrictions, Pain  Rehab Prognosis: Good  OP PT Plan  Treatment/Interventions: Aquatic therapy, Cryotherapy, Education/ Instruction, Gait training, Manual therapy,  Neuromuscular re-education, Self care/ home management, Taping techniques, Therapeutic activities, Therapeutic exercises  PT Plan: Skilled PT  PT Frequency: Other (Comment) (1-2x/week)  Duration: 10 visits  Certification Period Start Date: 05/15/24  Certification Period End Date: 08/13/24  Number of Treatments Authorized: 10 visits  Rehab Potential: Good  Plan of Care Agreement: Patient    General Visit Information  PT  Visit  PT Received On: 05/15/24    General  Reason for Referral: PT Evaluate and Treat  Referred By: Dr. Amanuel Nieves  General Comment: Diagnosis  S86.002D (ICD-10-CM) - Unspecified injury of left Achilles tendon, subsequent encounter    Insurance  Insurance reviewed  Name of Insurance:  [facility]  Visit No.  9/18   S/P LEFT ACHILLES REPAIR S86.002A 0% COINSUR/ 5000/10,000 DED / 6900/13,800 OOP / 40V CY / PA IS NOT REQ PER [facility] REF#: [id number] // Iris confirmed 3/21/24 4:02pm     Subjective:  Patient comes into clinic today for PT Re-Evaluation secondary to being 18 weeks s/p Excision of Calcaneal Spur (73391 (CPT®)) Left Consult  Secondary Repair of Achilles (44419 (CPT®)) Left Consult  C-arm, KINSEY 4400 SMALL BATTERY, ARTHREX: SPEED BRIDGE - (prone)    Paring of Callus, EXCISION OF SOFT TISSUE MASS performed on 1/10/2024) by Dr. Amanuel Nieves.  Patient reports that his L Achilles pain is the same overall, but functionally he is feeling a little better.  Patient also reports having lower back nerve issues which he is getting addressed at this time.      Precautions  STEADI Fall Risk Score (The score of 4 or more indicates an increased risk of falling): 9  LE Weight Bearing Status:  (WBAT L LE with boot on)  Medical Precautions:  (Back surgery x 8 (last one in 2019), spinal stimulator, R knee surgery x 3, L knee surgery x 2, R shoulder surgery x 1, Brain surgery 2013, L foot amputation, 22 nodules L lung, HTN, Lower back pain)  Post-Surgical Precautions:  (Excision of Calcaneal  Spur (02745 (CPT®)) Left Consult  Secondary Repair of Achilles (32332 (CPT®)) Left Consult  C-arm, KINSEY 4400 SMALL BATTERY, ARTHREX: SPEED BRIDGE - (prone)    Paring of Callus, EXCISION OF SOFT TISSUE MASS performed on 1/10/2024)    Pain:  Pain Assessment  Pain Assessment: 0-10  Pain Score: 7  Pain Location: Ankle  Pain Orientation: Left    Objective:  Restrictions as per MD's Protocol if surgical:  see script    Weightbearing Status:  WBAT L LE with boot off    Skin:  L Achilles surgical incision healed and closed.    Palpation:   point tenderness over L Achilles incision    Sensation:  Patient denies numbness/tingling of bilateral LOWER extremities.    Gait:  mild antalgic gait L LE    ROM:  AROM  L ankle DF 5 degrees  PF 50 degrees with pain  Inversion 20 degrees with pain  Eversion WNL's    Strength:    L ankle 4-/5 all planes with pain,     Outcome measure  Lower Extremity Funtional Score (LEFS): 27/80          Treatments    Therapeutic Exercise  Therapeutic Exercise Performed: Yes  Therapeutic Exercise Activity 1: Re-Evaluation      Treatment  Time in clinic started at:   11:30am  Time in clinic ended at:   12:00pm  Total time in clinic is:   30 minutes  Total timed code time is:  20 minutes    Treatment Performed Today:.   Therapeutic Exercise x 1 unit

## 2024-05-20 ENCOUNTER — APPOINTMENT (OUTPATIENT)
Dept: PHYSICAL THERAPY | Facility: CLINIC | Age: 39
End: 2024-05-20
Payer: COMMERCIAL

## 2024-05-22 ENCOUNTER — APPOINTMENT (OUTPATIENT)
Dept: PHYSICAL THERAPY | Facility: CLINIC | Age: 39
End: 2024-05-22
Payer: COMMERCIAL

## 2024-05-28 ENCOUNTER — APPOINTMENT (OUTPATIENT)
Dept: PHYSICAL THERAPY | Facility: CLINIC | Age: 39
End: 2024-05-28
Payer: COMMERCIAL

## 2024-05-30 ENCOUNTER — OFFICE VISIT (OUTPATIENT)
Dept: PRIMARY CARE | Facility: CLINIC | Age: 39
End: 2024-05-30
Payer: COMMERCIAL

## 2024-05-30 ENCOUNTER — APPOINTMENT (OUTPATIENT)
Dept: PHYSICAL THERAPY | Facility: CLINIC | Age: 39
End: 2024-05-30
Payer: COMMERCIAL

## 2024-05-30 VITALS
TEMPERATURE: 96.8 F | BODY MASS INDEX: 45.19 KG/M2 | DIASTOLIC BLOOD PRESSURE: 72 MMHG | SYSTOLIC BLOOD PRESSURE: 130 MMHG | HEIGHT: 75 IN

## 2024-05-30 DIAGNOSIS — E66.1 CLASS 3 DRUG-INDUCED OBESITY WITHOUT SERIOUS COMORBIDITY WITH BODY MASS INDEX (BMI) OF 45.0 TO 49.9 IN ADULT (MULTI): ICD-10-CM

## 2024-05-30 DIAGNOSIS — M76.62 ACHILLES TENDINITIS OF LEFT LOWER EXTREMITY: Primary | ICD-10-CM

## 2024-05-30 PROCEDURE — 1036F TOBACCO NON-USER: CPT | Performed by: FAMILY MEDICINE

## 2024-05-30 PROCEDURE — 99213 OFFICE O/P EST LOW 20 MIN: CPT | Performed by: FAMILY MEDICINE

## 2024-05-30 PROCEDURE — 3008F BODY MASS INDEX DOCD: CPT | Performed by: FAMILY MEDICINE

## 2024-05-30 RX ORDER — NORTRIPTYLINE HYDROCHLORIDE 25 MG/1
25 CAPSULE ORAL DAILY
COMMUNITY
Start: 2024-05-05 | End: 2024-06-04

## 2024-05-30 RX ORDER — HYDROCODONE BITARTRATE AND ACETAMINOPHEN 5; 325 MG/1; MG/1
1 TABLET ORAL EVERY 6 HOURS PRN
Qty: 70 TABLET | Refills: 0 | Status: SHIPPED | OUTPATIENT
Start: 2024-05-30 | End: 2024-06-17

## 2024-05-30 RX ORDER — GABAPENTIN 300 MG/1
300 CAPSULE ORAL 3 TIMES DAILY
COMMUNITY
Start: 2024-05-20

## 2024-05-30 ASSESSMENT — ENCOUNTER SYMPTOMS
LOSS OF SENSATION: 0
LOSS OF MOTION: 1
INABILITY TO BEAR WEIGHT: 1
TINGLING: 1
MUSCLE WEAKNESS: 1

## 2024-05-30 NOTE — PROGRESS NOTES
"Subjective   Patient ID: [id number]     Lacho Calix is a 38 y.o. male who presents for Follow-up (1 month recheck heel pain).  Lower Extremity Issue  The symptoms are aggravated by movement, palpation and weight bearing.     He is here for a recheck on chronic pain from his Achilles tendon injury.    Checked OARRS.  No duplicate scripts.  There is a script for gabapentin from Dr Ehsan Rodríguez.    Dr Rodríguez is a neurologist.  He has ordered labs.  B12, TSH, SPEP and A1c.      He started gabapentin and it is not helping.  SCS is not helping and made the pain worse.  He turned it back off.      He is out of the boot.  He is getting measured for a brace.  He has instability at the left ankle.       Objective     /72 (BP Location: Left arm, Patient Position: Sitting)   Temp 36 °C (96.8 °F) (Temporal)   Ht 1.905 m (6' 3\")   BMI 45.19 kg/m²      Physical Exam  Constitutional:       Appearance: He is not ill-appearing or toxic-appearing.   Musculoskeletal:      Comments: Tender to palpation at the left Achilles tendon.         Neurological:      Mental Status: He is alert.      Sensory: Sensory deficit (paresthesias stocking distribution.) present.         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  It is very important that you lose weight.  It is good that you stopped drinking soda.  I will start you on Ozempic.  Return in one month for a recheck.      Addi Mendenhall, DO   Answers submitted by the patient for this visit:  Lower Extremity Injury Questionnaire (Submitted " on 5/30/2024)  Chief Complaint: Lower extremity pain  Incident occurred: more than 1 week ago  Incident location: other  Injury mechanism: unknown  Pain location: left foot  Pain quality: aching, burning, shooting, stabbing  Pain - numeric: 7/10  Pain course: constant  tingling: Yes  inability to bear weight: Yes  loss of motion: Yes  loss of sensation: No  muscle weakness: Yes  Foreign body present: no foreign bodies, other

## 2024-07-01 ENCOUNTER — APPOINTMENT (OUTPATIENT)
Dept: PRIMARY CARE | Facility: CLINIC | Age: 39
End: 2024-07-01
Payer: COMMERCIAL

## 2024-07-01 VITALS — SYSTOLIC BLOOD PRESSURE: 130 MMHG | DIASTOLIC BLOOD PRESSURE: 80 MMHG | TEMPERATURE: 98.1 F

## 2024-07-01 DIAGNOSIS — E66.1 CLASS 3 DRUG-INDUCED OBESITY WITHOUT SERIOUS COMORBIDITY WITH BODY MASS INDEX (BMI) OF 45.0 TO 49.9 IN ADULT (MULTI): ICD-10-CM

## 2024-07-01 DIAGNOSIS — M76.62 ACHILLES TENDINITIS OF LEFT LOWER EXTREMITY: ICD-10-CM

## 2024-07-01 DIAGNOSIS — R73.9 HYPERGLYCEMIA: Primary | ICD-10-CM

## 2024-07-01 PROCEDURE — 1036F TOBACCO NON-USER: CPT | Performed by: FAMILY MEDICINE

## 2024-07-01 PROCEDURE — 3008F BODY MASS INDEX DOCD: CPT | Performed by: FAMILY MEDICINE

## 2024-07-01 PROCEDURE — 36415 COLL VENOUS BLD VENIPUNCTURE: CPT

## 2024-07-01 PROCEDURE — 83036 HEMOGLOBIN GLYCOSYLATED A1C: CPT

## 2024-07-01 PROCEDURE — 99213 OFFICE O/P EST LOW 20 MIN: CPT | Performed by: FAMILY MEDICINE

## 2024-07-01 RX ORDER — HYDROCODONE BITARTRATE AND ACETAMINOPHEN 5; 325 MG/1; MG/1
1 TABLET ORAL EVERY 6 HOURS PRN
Qty: 70 TABLET | Refills: 0 | Status: SHIPPED | OUTPATIENT
Start: 2024-07-01 | End: 2024-07-19

## 2024-07-01 ASSESSMENT — ENCOUNTER SYMPTOMS
MUSCLE WEAKNESS: 1
LOSS OF SENSATION: 1
LOSS OF MOTION: 1

## 2024-07-01 ASSESSMENT — PATIENT HEALTH QUESTIONNAIRE - PHQ9
SUM OF ALL RESPONSES TO PHQ9 QUESTIONS 1 AND 2: 0
2. FEELING DOWN, DEPRESSED OR HOPELESS: NOT AT ALL
1. LITTLE INTEREST OR PLEASURE IN DOING THINGS: NOT AT ALL

## 2024-07-01 NOTE — PROGRESS NOTES
Subjective   Patient ID: [id number]     Lacho Calix is a 38 y.o. male who presents for Follow-up and Med Refill.  Lower Extremity Issue  The symptoms are aggravated by movement, palpation and weight bearing.     He is here for a recheck on chronic pain.  He has multiple sources of pain but has had recent problems with chronic Achilles tendon pain.      Last visit, he was started on semaglutide for obesity.  He states it was not covered.  Needs to be diabetic or pre-diabetic.    He was fitted for a brace.  It hurts when he tries to wear a shoe but otherwise if feels ok.  It does hurt his shin and ankle.    He is now able to walk without the cane.  He is needing the medication less than before but he still needs it.  He could make the 18 day supply last the whole month this time.    Objective     /80 (BP Location: Left arm, Patient Position: Sitting)   Temp 36.7 °C (98.1 °F) (Skin)      Physical Exam  Musculoskeletal:         General: Tenderness present.      Right lower leg: No edema.      Left lower leg: No edema.      Comments: Tender at the left achilles.  Gait antalgic.   Neurological:      Mental Status: He is alert.         Assessment/Plan   Problem List Items Addressed This Visit    None  Visit Diagnoses       Hyperglycemia    -  Primary    Relevant Orders    Hemoglobin A1C    Class 3 drug-induced obesity without serious comorbidity with body mass index (BMI) of 45.0 to 49.9 in adult (Multi)        Achilles tendinitis of left lower extremity        Relevant Medications    HYDROcodone-acetaminophen (Norco) 5-325 mg tablet        I refilled hydrocodone.  I ordered an A1c to evaluate you for diabetes or pre-diabetes.  Return in one month.    Addi Mendenhall DO   Answers submitted by the patient for this visit:  Lower Extremity Injury Questionnaire (Submitted on 7/1/2024)  Chief Complaint: Lower extremity pain  Incident occurred: more than 1 week ago  Incident location: other  Injury mechanism:  unknown  Pain location: left heel  Pain quality: aching, burning, shooting, stabbing  Pain - numeric: 8/10  Pain course: constant  loss of motion: Yes  loss of sensation: Yes  muscle weakness: Yes  Foreign body present: no foreign bodies

## 2024-07-02 LAB
EST. AVERAGE GLUCOSE BLD GHB EST-MCNC: 97 MG/DL
HBA1C MFR BLD: 5 %

## 2024-07-14 DIAGNOSIS — K21.9 GASTRO-ESOPHAGEAL REFLUX DISEASE WITHOUT ESOPHAGITIS: ICD-10-CM

## 2024-07-15 RX ORDER — OMEPRAZOLE 40 MG/1
CAPSULE, DELAYED RELEASE ORAL
Qty: 30 CAPSULE | Refills: 1 | Status: SHIPPED | OUTPATIENT
Start: 2024-07-15

## 2024-08-01 ENCOUNTER — APPOINTMENT (OUTPATIENT)
Dept: PRIMARY CARE | Facility: CLINIC | Age: 39
End: 2024-08-01
Payer: COMMERCIAL

## 2024-08-01 VITALS — SYSTOLIC BLOOD PRESSURE: 130 MMHG | TEMPERATURE: 97.8 F | DIASTOLIC BLOOD PRESSURE: 80 MMHG

## 2024-08-01 DIAGNOSIS — H65.112 NON-RECURRENT ACUTE ALLERGIC OTITIS MEDIA OF LEFT EAR: Primary | ICD-10-CM

## 2024-08-01 DIAGNOSIS — M76.62 ACHILLES TENDINITIS OF LEFT LOWER EXTREMITY: ICD-10-CM

## 2024-08-01 PROCEDURE — 1036F TOBACCO NON-USER: CPT | Performed by: FAMILY MEDICINE

## 2024-08-01 PROCEDURE — 99214 OFFICE O/P EST MOD 30 MIN: CPT | Performed by: FAMILY MEDICINE

## 2024-08-01 RX ORDER — HYDROCODONE BITARTRATE AND ACETAMINOPHEN 5; 325 MG/1; MG/1
1 TABLET ORAL EVERY 6 HOURS PRN
Qty: 70 TABLET | Refills: 0 | Status: SHIPPED | OUTPATIENT
Start: 2024-08-01 | End: 2024-08-19

## 2024-08-01 RX ORDER — AMOXICILLIN AND CLAVULANATE POTASSIUM 875; 125 MG/1; MG/1
875 TABLET, FILM COATED ORAL 2 TIMES DAILY
Qty: 20 TABLET | Refills: 0 | Status: SHIPPED | OUTPATIENT
Start: 2024-08-01 | End: 2024-08-11

## 2024-08-01 ASSESSMENT — ENCOUNTER SYMPTOMS
TINGLING: 1
MUSCLE WEAKNESS: 1
LOSS OF SENSATION: 1
LOSS OF MOTION: 1
INABILITY TO BEAR WEIGHT: 1

## 2024-08-01 NOTE — PROGRESS NOTES
"Subjective   Patient ID: [id number]     Lacho Calix is a 38 y.o. male who presents for Follow-up, Med Refill, and Earache (Left ear.).  Lower Extremity Issue  The symptoms are aggravated by movement, palpation and weight bearing.   Answers submitted by the patient for this visit:  Lower Extremity Injury Questionnaire (Submitted on 8/1/2024)  Chief Complaint: Lower extremity pain  Incident occurred: more than 1 week ago  Incident location: in the yard  Injury mechanism: unknown  Pain location: left heel  Pain quality: aching, burning, shooting, stabbing  Pain - numeric: 7/10  Pain course: constant  tingling: Yes  inability to bear weight: Yes  loss of motion: Yes  loss of sensation: Yes  muscle weakness: Yes  Foreign body present: no foreign bodies    He is here for a recheck on chronic pain.  He has a significant  left Achilles tendon injury.  He has been taking off the walking boot.  They have started him on gabapentin.  He still has ongoing pain.  \"Feels like it is on fire.\". the hydrocodone helps keep the pain at bay. It helps better than the gabapentin.         He has also had severe back pain for years.      He complains of pain in the left ear.  Objective     /80 (BP Location: Left arm, Patient Position: Sitting)   Temp 36.6 °C (97.8 °F) (Skin)      Physical Exam  Vitals reviewed.   Constitutional:       General: He is not in acute distress.     Appearance: Normal appearance. He is not ill-appearing or toxic-appearing.   HENT:      Head: Normocephalic and atraumatic.      Right Ear: Tympanic membrane, ear canal and external ear normal.      Left Ear: Ear canal and external ear normal.      Ears:      Comments: Red TM on left.  Fluid noted.     Nose: Nose normal.      Mouth/Throat:      Mouth: Mucous membranes are moist.   Cardiovascular:      Rate and Rhythm: Normal rate and regular rhythm.      Heart sounds: No murmur heard.  Pulmonary:      Effort: Pulmonary effort is normal.      Breath sounds: " Normal breath sounds.   Musculoskeletal:         General: Tenderness (tender at the left Achilles tendon.  ROM intackt.  tender with weight bearing.) present.      Cervical back: Neck supple.      Right lower leg: No edema.      Left lower leg: No edema.   Skin:     Coloration: Skin is not jaundiced or pale.      Findings: No rash.   Neurological:      General: No focal deficit present.      Mental Status: He is alert and oriented to person, place, and time. Mental status is at baseline.      Gait: Gait abnormal.   Psychiatric:         Mood and Affect: Mood normal.         Behavior: Behavior normal.         Thought Content: Thought content normal.         Judgment: Judgment normal.         Assessment/Plan   Problem List Items Addressed This Visit    None  Visit Diagnoses       Non-recurrent acute allergic otitis media of left ear    -  Primary    Relevant Medications    amoxicillin-pot clavulanate (Augmentin) 875-125 mg tablet    Achilles tendinitis of left lower extremity        Relevant Medications    HYDROcodone-acetaminophen (Norco) 5-325 mg tablet          I refilled your medication.  Return in one month for a recheck.  Antibiotics were given for an ear infection.  Addi Mendenhall, DO

## 2024-08-01 NOTE — PATIENT INSTRUCTIONS
I refilled your medication.  Return in one month for a recheck.  Antibiotics were given for an ear infection.

## 2024-09-03 ENCOUNTER — APPOINTMENT (OUTPATIENT)
Dept: PRIMARY CARE | Facility: CLINIC | Age: 39
End: 2024-09-03
Payer: COMMERCIAL

## 2024-09-03 VITALS — TEMPERATURE: 97.7 F | SYSTOLIC BLOOD PRESSURE: 130 MMHG | DIASTOLIC BLOOD PRESSURE: 80 MMHG

## 2024-09-03 DIAGNOSIS — S86.012A ACHILLES TENDON TEAR, LEFT, INITIAL ENCOUNTER: Primary | ICD-10-CM

## 2024-09-03 PROCEDURE — 1036F TOBACCO NON-USER: CPT | Performed by: FAMILY MEDICINE

## 2024-09-03 PROCEDURE — 99213 OFFICE O/P EST LOW 20 MIN: CPT | Performed by: FAMILY MEDICINE

## 2024-09-03 RX ORDER — HYDROCODONE BITARTRATE AND ACETAMINOPHEN 5; 325 MG/1; MG/1
1 TABLET ORAL EVERY 6 HOURS PRN
Qty: 70 TABLET | Refills: 0 | Status: SHIPPED | OUTPATIENT
Start: 2024-09-03 | End: 2024-09-21

## 2024-09-03 ASSESSMENT — ENCOUNTER SYMPTOMS
LOSS OF SENSATION: 0
LOSS OF MOTION: 1
TINGLING: 0
MUSCLE WEAKNESS: 1
INABILITY TO BEAR WEIGHT: 1

## 2024-09-03 NOTE — PATIENT INSTRUCTIONS
I will refill your medication.  Follow up with the MRI and your orthopedic specialist.  Return in one month.

## 2024-09-15 DIAGNOSIS — K21.9 GASTRO-ESOPHAGEAL REFLUX DISEASE WITHOUT ESOPHAGITIS: ICD-10-CM

## 2024-09-15 RX ORDER — OMEPRAZOLE 40 MG/1
CAPSULE, DELAYED RELEASE ORAL
Qty: 30 CAPSULE | Refills: 1 | Status: SHIPPED | OUTPATIENT
Start: 2024-09-15

## 2024-09-30 ENCOUNTER — APPOINTMENT (OUTPATIENT)
Dept: PRIMARY CARE | Facility: CLINIC | Age: 39
End: 2024-09-30
Payer: COMMERCIAL

## 2024-09-30 VITALS — TEMPERATURE: 97.2 F | DIASTOLIC BLOOD PRESSURE: 92 MMHG | SYSTOLIC BLOOD PRESSURE: 130 MMHG

## 2024-09-30 DIAGNOSIS — M76.62 ACHILLES TENDINITIS OF LEFT LOWER EXTREMITY: Primary | ICD-10-CM

## 2024-09-30 PROCEDURE — 99213 OFFICE O/P EST LOW 20 MIN: CPT | Performed by: FAMILY MEDICINE

## 2024-09-30 PROCEDURE — 1036F TOBACCO NON-USER: CPT | Performed by: FAMILY MEDICINE

## 2024-09-30 RX ORDER — HYDROCODONE BITARTRATE AND ACETAMINOPHEN 5; 325 MG/1; MG/1
1 TABLET ORAL EVERY 6 HOURS PRN
Qty: 100 TABLET | Refills: 0 | Status: SHIPPED | OUTPATIENT
Start: 2024-09-30 | End: 2024-10-25

## 2024-09-30 ASSESSMENT — ENCOUNTER SYMPTOMS
INABILITY TO BEAR WEIGHT: 1
MUSCLE WEAKNESS: 1
LOSS OF MOTION: 1
TINGLING: 0
LOSS OF SENSATION: 0

## 2024-09-30 NOTE — PROGRESS NOTES
"Subjective   Patient ID: [id number]     Lacho Calix is a 38 y.o. male who presents for Follow-up (One month) and Med Refill.  Lower Extremity Issue  The symptoms are aggravated by movement, palpation and weight bearing.     He is here for a recheck.      He has chronic pain from an Achilles tendon injury.  He recently was found to have a sixty percent rupture.  His tendon is balled up and tender at the base of the calf.     The pain is \"horrible\" about the same.      Medication is helping some \"as best as it can.\"  Takes the edge off.    Checked OARRS.  No outside script.  Objective     BP (!) 130/92 (BP Location: Left arm, Patient Position: Sitting)   Temp 36.2 °C (97.2 °F) (Skin)      Physical Exam  Constitutional:       Appearance: Normal appearance.   Musculoskeletal:      Comments: Wearing the walking cast.  He was told he needs another surgery.   Neurological:      Mental Status: He is alert.         Assessment/Plan   Problem List Items Addressed This Visit    None  Visit Diagnoses       Achilles tendinitis of left lower extremity    -  Primary    Relevant Medications    HYDROcodone-acetaminophen (Norco) 5-325 mg tablet            Addi Mendenhall DO   Answers submitted by the patient for this visit:  Lower Extremity Injury Questionnaire (Submitted on 9/30/2024)  Chief Complaint: Lower extremity pain  Incident occurred: more than 1 week ago  Incident location: at home  Injury mechanism: unknown  Pain location: left heel  Pain quality: burning, cramping, shooting, stabbing  Pain - numeric: 8/10  Pain course: constant  tingling: No  inability to bear weight: Yes  loss of motion: Yes  loss of sensation: No  muscle weakness: Yes  Foreign body present: no foreign bodies  I refilled your medication.  Follow up with orthopedics and continue the cast.  Return in one month for a recheck.   "

## 2024-09-30 NOTE — PATIENT INSTRUCTIONS
I refilled your medication.  Follow up with orthopedics and continue the cast.  Return in one month for a recheck.

## 2024-10-03 ENCOUNTER — APPOINTMENT (OUTPATIENT)
Dept: PRIMARY CARE | Facility: CLINIC | Age: 39
End: 2024-10-03
Payer: COMMERCIAL

## 2024-10-27 DIAGNOSIS — K21.9 GASTRO-ESOPHAGEAL REFLUX DISEASE WITHOUT ESOPHAGITIS: ICD-10-CM

## 2024-10-28 RX ORDER — OMEPRAZOLE 40 MG/1
CAPSULE, DELAYED RELEASE ORAL
Qty: 30 CAPSULE | Refills: 1 | Status: SHIPPED | OUTPATIENT
Start: 2024-10-28

## 2024-10-29 ENCOUNTER — APPOINTMENT (OUTPATIENT)
Dept: PRIMARY CARE | Facility: CLINIC | Age: 39
End: 2024-10-29
Payer: COMMERCIAL

## 2024-10-29 VITALS — DIASTOLIC BLOOD PRESSURE: 80 MMHG | SYSTOLIC BLOOD PRESSURE: 110 MMHG | TEMPERATURE: 98 F

## 2024-10-29 DIAGNOSIS — M76.62 ACHILLES TENDINITIS OF LEFT LOWER EXTREMITY: Primary | ICD-10-CM

## 2024-10-29 PROCEDURE — 99213 OFFICE O/P EST LOW 20 MIN: CPT | Performed by: FAMILY MEDICINE

## 2024-10-29 PROCEDURE — 1036F TOBACCO NON-USER: CPT | Performed by: FAMILY MEDICINE

## 2024-10-29 RX ORDER — HYDROCODONE BITARTRATE AND ACETAMINOPHEN 5; 325 MG/1; MG/1
1 TABLET ORAL EVERY 6 HOURS PRN
Qty: 100 TABLET | Refills: 0 | Status: SHIPPED | OUTPATIENT
Start: 2024-10-29 | End: 2024-11-23

## 2024-10-29 ASSESSMENT — PATIENT HEALTH QUESTIONNAIRE - PHQ9
1. LITTLE INTEREST OR PLEASURE IN DOING THINGS: NOT AT ALL
2. FEELING DOWN, DEPRESSED OR HOPELESS: NOT AT ALL
SUM OF ALL RESPONSES TO PHQ9 QUESTIONS 1 AND 2: 0

## 2024-11-26 ENCOUNTER — APPOINTMENT (OUTPATIENT)
Dept: PRIMARY CARE | Facility: CLINIC | Age: 39
End: 2024-11-26
Payer: COMMERCIAL

## 2024-11-26 VITALS — SYSTOLIC BLOOD PRESSURE: 142 MMHG | TEMPERATURE: 97.3 F | DIASTOLIC BLOOD PRESSURE: 78 MMHG

## 2024-11-26 DIAGNOSIS — R60.0 LOCALIZED EDEMA: ICD-10-CM

## 2024-11-26 DIAGNOSIS — M76.62 ACHILLES TENDINITIS OF LEFT LOWER EXTREMITY: Primary | ICD-10-CM

## 2024-11-26 PROCEDURE — 99213 OFFICE O/P EST LOW 20 MIN: CPT | Performed by: FAMILY MEDICINE

## 2024-11-26 PROCEDURE — 1036F TOBACCO NON-USER: CPT | Performed by: FAMILY MEDICINE

## 2024-11-26 RX ORDER — HYDROCODONE BITARTRATE AND ACETAMINOPHEN 5; 325 MG/1; MG/1
1 TABLET ORAL EVERY 6 HOURS PRN
Qty: 100 TABLET | Refills: 0 | Status: SHIPPED | OUTPATIENT
Start: 2024-11-26 | End: 2024-12-21

## 2024-11-26 RX ORDER — FUROSEMIDE 20 MG/1
20 TABLET ORAL DAILY
Qty: 30 TABLET | Refills: 2 | Status: SHIPPED | OUTPATIENT
Start: 2024-11-26 | End: 2025-11-26

## 2024-11-26 ASSESSMENT — PATIENT HEALTH QUESTIONNAIRE - PHQ9
2. FEELING DOWN, DEPRESSED OR HOPELESS: NOT AT ALL
SUM OF ALL RESPONSES TO PHQ9 QUESTIONS 1 AND 2: 0
1. LITTLE INTEREST OR PLEASURE IN DOING THINGS: NOT AT ALL

## 2024-11-26 NOTE — PATIENT INSTRUCTIONS
I will order refills of your pain medication and add a diuretic.  Return in one month for a recheck.

## 2024-11-26 NOTE — PROGRESS NOTES
Subjective   Patient ID: [id number]     Lacho Calix is a 38 y.o. male who presents for Follow-up and Med Refill.  HPI  He is here for a recheck on chronic pain.  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  Most recently and significantly is his achilles tendon injury.      He had the surgery on the Achiles on 1/10/24.  He states that he was told the achilles is not healing on the attachment to the calcaneus.        He has a spinal cord stimulator in place.  He remains on Vicodin, gabapentin, nortriptyline, omeprazole and tizanidine.     He has a snapped tendon of the posterior tibialis.  He has been told that that will need surgery.          Current Outpatient Medications on File Prior to Visit   Medication Sig Dispense Refill    gabapentin (Neurontin) 300 mg capsule Take 1 capsule (300 mg) by mouth 3 times a day.      nortriptyline (Pamelor) 25 mg capsule Take 1 capsule (25 mg) by mouth once daily.      omeprazole (PriLOSEC) 40 mg DR capsule TAKE 1 CAPSULE BY MOUTH EVERY DAY 30 capsule 1    tiZANidine (Zanaflex) 4 mg tablet Take 2 tablets (8 mg) by mouth every 8 hours if needed.      [DISCONTINUED] HYDROcodone-acetaminophen (Norco) 5-325 mg tablet Take 1 tablet by mouth every 6 hours if needed for severe pain (7 - 10) for up to 25 days. 100 tablet 0     No current facility-administered medications on file prior to visit.     Checked OARRS.  No outside scripts.      Objective     /78 (BP Location: Left arm, Patient Position: Sitting)   Temp 36.3 °C (97.3 °F) (Skin)      Physical Exam  Constitutional:       Appearance: Normal appearance.   Musculoskeletal:         General: Tenderness (tender at achilles and at ankle posterior to the medial malleolus.) present.      Right lower leg: Edema present.      Left lower leg: Edema present.   Neurological:      Mental Status: He is alert.         Assessment/Plan   Problem List Items Addressed This  Visit    None  Visit Diagnoses       Achilles tendinitis of left lower extremity    -  Primary    Relevant Medications    furosemide (Lasix) 20 mg tablet    HYDROcodone-acetaminophen (Norco) 5-325 mg tablet    Localized edema              I will order refills of your pain medication and add a diuretic.  Return in one month for a recheck.   Addi Mendenhall, DO

## 2025-01-02 ENCOUNTER — APPOINTMENT (OUTPATIENT)
Dept: PRIMARY CARE | Facility: CLINIC | Age: 40
End: 2025-01-02
Payer: COMMERCIAL

## 2025-01-02 VITALS — TEMPERATURE: 97.6 F | DIASTOLIC BLOOD PRESSURE: 80 MMHG | SYSTOLIC BLOOD PRESSURE: 150 MMHG

## 2025-01-02 DIAGNOSIS — S86.012A ACHILLES TENDON TEAR, LEFT, INITIAL ENCOUNTER: Primary | ICD-10-CM

## 2025-01-02 DIAGNOSIS — M76.62 ACHILLES TENDINITIS OF LEFT LOWER EXTREMITY: ICD-10-CM

## 2025-01-02 PROCEDURE — 99213 OFFICE O/P EST LOW 20 MIN: CPT | Performed by: FAMILY MEDICINE

## 2025-01-02 PROCEDURE — 1036F TOBACCO NON-USER: CPT | Performed by: FAMILY MEDICINE

## 2025-01-02 RX ORDER — HYDROCODONE BITARTRATE AND ACETAMINOPHEN 5; 325 MG/1; MG/1
1 TABLET ORAL EVERY 6 HOURS PRN
Qty: 100 TABLET | Refills: 0 | Status: SHIPPED | OUTPATIENT
Start: 2025-01-02 | End: 2025-01-27

## 2025-01-02 ASSESSMENT — ENCOUNTER SYMPTOMS
LOSS OF MOTION: 1
MUSCLE WEAKNESS: 1

## 2025-01-15 ENCOUNTER — HOSPITAL ENCOUNTER (OUTPATIENT)
Dept: RADIOLOGY | Facility: CLINIC | Age: 40
Discharge: HOME | End: 2025-01-15
Payer: COMMERCIAL

## 2025-01-15 ENCOUNTER — OFFICE VISIT (OUTPATIENT)
Dept: PRIMARY CARE | Facility: CLINIC | Age: 40
End: 2025-01-15
Payer: COMMERCIAL

## 2025-01-15 VITALS
BODY MASS INDEX: 47.5 KG/M2 | HEART RATE: 110 BPM | RESPIRATION RATE: 20 BRPM | OXYGEN SATURATION: 95 % | WEIGHT: 315 LBS | SYSTOLIC BLOOD PRESSURE: 130 MMHG | DIASTOLIC BLOOD PRESSURE: 82 MMHG | TEMPERATURE: 97.7 F

## 2025-01-15 DIAGNOSIS — J06.9 UPPER RESPIRATORY TRACT INFECTION, UNSPECIFIED TYPE: Primary | ICD-10-CM

## 2025-01-15 DIAGNOSIS — J06.9 UPPER RESPIRATORY TRACT INFECTION, UNSPECIFIED TYPE: ICD-10-CM

## 2025-01-15 LAB — POC SARS-COV-2 AG BINAX: NORMAL

## 2025-01-15 PROCEDURE — 71046 X-RAY EXAM CHEST 2 VIEWS: CPT

## 2025-01-15 PROCEDURE — 99213 OFFICE O/P EST LOW 20 MIN: CPT

## 2025-01-15 PROCEDURE — 1036F TOBACCO NON-USER: CPT

## 2025-01-15 PROCEDURE — 71046 X-RAY EXAM CHEST 2 VIEWS: CPT | Performed by: RADIOLOGY

## 2025-01-15 PROCEDURE — 87811 SARS-COV-2 COVID19 W/OPTIC: CPT

## 2025-01-15 RX ORDER — IPRATROPIUM BROMIDE 42 UG/1
2 SPRAY, METERED NASAL 4 TIMES DAILY
Qty: 15 ML | Refills: 0 | Status: SHIPPED | OUTPATIENT
Start: 2025-01-15 | End: 2025-01-19

## 2025-01-15 RX ORDER — ALBUTEROL SULFATE 90 UG/1
2 INHALANT RESPIRATORY (INHALATION) EVERY 6 HOURS PRN
Qty: 18 G | Refills: 0 | Status: SHIPPED | OUTPATIENT
Start: 2025-01-15 | End: 2025-02-14

## 2025-01-15 ASSESSMENT — ENCOUNTER SYMPTOMS
ABDOMINAL PAIN: 0
WHEEZING: 1
CLAUDICATION: 0
SORE THROAT: 0
NECK PAIN: 1
SHORTNESS OF BREATH: 1
VOMITING: 0
HEMOPTYSIS: 0
SWOLLEN GLANDS: 0
SYNCOPE: 0
FEVER: 0
PND: 1
COUGH: 1
LEG PAIN: 0
RHINORRHEA: 1
SPUTUM PRODUCTION: 1
ORTHOPNEA: 1
HEADACHES: 1

## 2025-01-15 NOTE — PROGRESS NOTES
Subjective   Patient ID: Lacho Calix is a 39 y.o. male who presents for Cough (PCP Dr COTA.   Dry cough, congestion, sinus pressure, ears are clogged, cold sweats, body aches x 4 days. Denies fever. Denies NVD/Trying dayquil and tati back and body).    Patient here today for 4 days of URI symptoms  Congestion, cough (dry), headache, rhinorrhea (yellow mucous), clogged/stuffy ears, shortness of breath with wheezing.  Wife was concerned that patient felt clammy, prompted him to schedule appointment  Appetite is diminished. Is drinking normal amounts of water  Has been controlling symptoms OTC medications: Tati back/body, dayquil/nightquil.    Shortness of Breath  This is a new problem. The current episode started in the past 7 days. The problem occurs daily. The problem has been waxing and waning. The average episode lasts 60 seconds. Associated symptoms include chest pain, coryza, ear pain, headaches, leg swelling, neck pain, orthopnea, PND, rhinorrhea, sputum production and wheezing. Pertinent negatives include no abdominal pain, claudication, fever, hemoptysis, leg pain, rash, sore throat, swollen glands, syncope or vomiting. The symptoms are aggravated by URIs and lying flat.        Review of Systems   Constitutional:  Negative for fever.   HENT:  Positive for ear pain and rhinorrhea. Negative for sore throat.    Respiratory:  Positive for cough, sputum production, shortness of breath and wheezing. Negative for hemoptysis.    Cardiovascular:  Positive for chest pain, orthopnea, leg swelling and PND. Negative for claudication and syncope.   Gastrointestinal:  Negative for abdominal pain and vomiting.   Musculoskeletal:  Positive for neck pain.   Skin:  Negative for rash.   Neurological:  Positive for headaches.       Objective   /82 (BP Location: Right arm, Patient Position: Sitting)   Pulse 110   Temp 36.5 °C (97.7 °F)   Resp 20   Wt (!) 172 kg (380 lb)   SpO2 95%   BMI 47.50 kg/m²     Physical  Exam  Constitutional:       General: He is not in acute distress.     Appearance: He is not toxic-appearing.   HENT:      Right Ear: Tympanic membrane normal.      Ears:      Comments: Hyperemic left tympanic membrane no discernible exudate notable, does not appear bulging, good cone of light.     Nose: Congestion and rhinorrhea present.      Mouth/Throat:      Mouth: Mucous membranes are moist.      Pharynx: No oropharyngeal exudate or posterior oropharyngeal erythema.   Eyes:      General:         Right eye: No discharge.         Left eye: No discharge.      Conjunctiva/sclera: Conjunctivae normal.   Cardiovascular:      Rate and Rhythm: Normal rate and regular rhythm.      Pulses: Normal pulses.      Heart sounds: No murmur heard.     No friction rub. No gallop.   Pulmonary:      Effort: Pulmonary effort is normal. No respiratory distress.      Breath sounds: No wheezing, rhonchi or rales.   Musculoskeletal:      Cervical back: No tenderness.   Lymphadenopathy:      Cervical: No cervical adenopathy.   Neurological:      Mental Status: He is alert.       Assessment/Plan   Problem List Items Addressed This Visit    None  Visit Diagnoses         Codes    Upper respiratory tract infection, unspecified type    -  Primary J06.9    Relevant Medications    albuterol 90 mcg/actuation inhaler    ipratropium (Atrovent) 42 mcg (0.06 %) nasal spray    Other Relevant Orders    POCT BinaxNOW Covid-19 Ag Card manually resulted (Completed)    XR chest 2 views        Suspect viral URI at this time.  Physical exam was notable for some mild expiratory wheezing notable in left lung fields therefore will prescribe albuterol inhaler as needed.  Patient has also noticed some audible wheezing along with mild shortness of breath.  Order chest x-ray to evaluate for any pneumonia.  Has been afebrile however regularly is taking antipyretics, otherwise patient saturating well in room air.  Atrovent nasal spray for short course given nasal  congestion/rhinorrhea.  If symptoms fail to improve or worsen despite conservative management over the next 2 days recommend patient contact office as you may warrant antibiotic treatment at that time.    Ketan Young, DO

## 2025-01-26 DIAGNOSIS — K21.9 GASTRO-ESOPHAGEAL REFLUX DISEASE WITHOUT ESOPHAGITIS: ICD-10-CM

## 2025-01-27 RX ORDER — OMEPRAZOLE 40 MG/1
40 CAPSULE, DELAYED RELEASE ORAL DAILY
Qty: 30 CAPSULE | Refills: 1 | Status: SHIPPED | OUTPATIENT
Start: 2025-01-27

## 2025-02-03 ENCOUNTER — APPOINTMENT (OUTPATIENT)
Dept: PRIMARY CARE | Facility: CLINIC | Age: 40
End: 2025-02-03
Payer: COMMERCIAL

## 2025-02-03 VITALS — TEMPERATURE: 97.6 F | SYSTOLIC BLOOD PRESSURE: 142 MMHG | DIASTOLIC BLOOD PRESSURE: 80 MMHG

## 2025-02-03 DIAGNOSIS — S86.012A ACHILLES TENDON TEAR, LEFT, INITIAL ENCOUNTER: Primary | ICD-10-CM

## 2025-02-03 DIAGNOSIS — M76.62 ACHILLES TENDINITIS OF LEFT LOWER EXTREMITY: ICD-10-CM

## 2025-02-03 DIAGNOSIS — K63.5 POLYP OF COLON, UNSPECIFIED PART OF COLON, UNSPECIFIED TYPE: ICD-10-CM

## 2025-02-03 DIAGNOSIS — K50.919 CROHN'S DISEASE WITH COMPLICATION, UNSPECIFIED GASTROINTESTINAL TRACT LOCATION: ICD-10-CM

## 2025-02-03 PROCEDURE — 1036F TOBACCO NON-USER: CPT | Performed by: FAMILY MEDICINE

## 2025-02-03 PROCEDURE — 99213 OFFICE O/P EST LOW 20 MIN: CPT | Performed by: FAMILY MEDICINE

## 2025-02-03 RX ORDER — HYDROCODONE BITARTRATE AND ACETAMINOPHEN 5; 325 MG/1; MG/1
1 TABLET ORAL EVERY 6 HOURS PRN
Qty: 100 TABLET | Refills: 0 | Status: SHIPPED | OUTPATIENT
Start: 2025-02-03 | End: 2025-02-28

## 2025-02-03 NOTE — PATIENT INSTRUCTIONS
I will order refills of your medication.  Return in one month.  You will be due to repeat the colonoscopy in June 2025 and agree to call Dr Thomas for an appointment regarding your Crohn's disease.

## 2025-02-03 NOTE — PROGRESS NOTES
Subjective   Patient ID: [id number]     Lacho Calix is a 39 y.o. male who presents for Follow-up (One month).  HPI  He is here for a recheck on chronic pain from his Achilles tendon injury.      Surgery has been recommended by Dr Pj Arcos.  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     He has ongoing pain in the left Achilles.     He has followed Dr William ORTIZ for Crohn's disease.   His last colonoscopy was in 6/2022.  Polyps were found.  He was instructed to recheck colonoscopy in 6/2025.        Objective     /80 (BP Location: Left arm, Patient Position: Sitting)   Temp 36.4 °C (97.6 °F)      Physical Exam  Constitutional:       Appearance: Normal appearance.   Musculoskeletal:      Comments: Tender at the left Achilles tender.  Some hypertrophic changes at the tendon.     Neurological:      Mental Status: He is alert.         Assessment/Plan   Problem List Items Addressed This Visit    None  Visit Diagnoses       Achilles tendon tear, left, initial encounter    -  Primary    Crohn's disease with complication, unspecified gastrointestinal tract location        Polyp of colon, unspecified part of colon, unspecified type        Achilles tendinitis of left lower extremity              I will order refills of your medication.  Return in one month.  You will be due to repeat the colonoscopy in June 2025 and agree to call Dr Thomas for an appointment regarding your Crohn's disease.   Addi Mendenhall, DO

## 2025-02-04 ENCOUNTER — APPOINTMENT (OUTPATIENT)
Dept: PRIMARY CARE | Facility: CLINIC | Age: 40
End: 2025-02-04
Payer: COMMERCIAL

## 2025-02-13 ENCOUNTER — TELEMEDICINE (OUTPATIENT)
Dept: PRIMARY CARE | Facility: CLINIC | Age: 40
End: 2025-02-13
Payer: COMMERCIAL

## 2025-02-13 DIAGNOSIS — J01.00 ACUTE MAXILLARY SINUSITIS, RECURRENCE NOT SPECIFIED: Primary | ICD-10-CM

## 2025-02-13 PROCEDURE — 99213 OFFICE O/P EST LOW 20 MIN: CPT | Performed by: FAMILY MEDICINE

## 2025-02-13 PROCEDURE — 1036F TOBACCO NON-USER: CPT | Performed by: FAMILY MEDICINE

## 2025-02-13 RX ORDER — AMOXICILLIN AND CLAVULANATE POTASSIUM 875; 125 MG/1; MG/1
875 TABLET, FILM COATED ORAL 2 TIMES DAILY
Qty: 20 TABLET | Refills: 0 | Status: SHIPPED | OUTPATIENT
Start: 2025-02-13 | End: 2025-02-23

## 2025-02-13 RX ORDER — FLUTICASONE PROPIONATE 50 MCG
1 SPRAY, SUSPENSION (ML) NASAL DAILY
Qty: 16 G | Refills: 0 | Status: SHIPPED | OUTPATIENT
Start: 2025-02-13 | End: 2026-02-13

## 2025-02-13 ASSESSMENT — ENCOUNTER SYMPTOMS
DIARRHEA: 0
TROUBLE SWALLOWING: 1
NECK PAIN: 1
VOMITING: 0
SHORTNESS OF BREATH: 1
STRIDOR: 1
SORE THROAT: 1
SWOLLEN GLANDS: 1
HOARSE VOICE: 1
COUGH: 1
ABDOMINAL PAIN: 0
HEADACHES: 1

## 2025-02-13 NOTE — PROGRESS NOTES
Subjective   Patient ID: [id number]   Virtual or Telephone Consent    An interactive audio and video telecommunication system which permits real time communications between the patient (at the originating site) and provider (at the distant site) was utilized to provide this telehealth service.   Verbal consent was requested and obtained from Lacho Calix on this date, 02/13/25 for a telehealth visit.     Lacho Calix is a 39 y.o. male who presents for coughing and sinus congestion.  Sore Throat   This is a new problem. The current episode started in the past 7 days. The problem has been gradually worsening. The pain is worse on the right side. The maximum temperature recorded prior to his arrival was 100 - 100.9 F. The fever has been present for 1 to 2 days. The pain is at a severity of 5/10. The pain is mild. Associated symptoms include congestion, coughing, drooling, ear discharge, ear pain, headaches, a hoarse voice, a plugged ear sensation, neck pain, shortness of breath, stridor, swollen glands and trouble swallowing. Pertinent negatives include no abdominal pain, diarrhea or vomiting.       He has coughing and left maxillary pain.  Mucous is yellow.  He states he smells the infection.  He has a lot of pressure behind his eyes.      This started Sunday night.    Objective     There were no vitals taken for this visit.     Physical Exam  Constitutional:       General: He is not in acute distress.     Appearance: He is not ill-appearing.      Comments: Appears tired today.     Pulmonary:      Effort: Pulmonary effort is normal. No respiratory distress.   Neurological:      Mental Status: He is alert and oriented to person, place, and time. Mental status is at baseline.         Assessment/Plan   Problem List Items Addressed This Visit    None  Visit Diagnoses       Acute maxillary sinusitis, recurrence not specified    -  Primary    Relevant Medications    amoxicillin-pot clavulanate (Augmentin) 875-125 mg  tablet    fluticasone (Flonase) 50 mcg/actuation nasal spray        I ordered antibiotics and a steroid nasal spray for a suspected sinus infection.  Return in one week if this persists without much improvement.     Addi Mendenhall, DO

## 2025-02-24 ENCOUNTER — TELEPHONE (OUTPATIENT)
Dept: PRIMARY CARE | Facility: CLINIC | Age: 40
End: 2025-02-24
Payer: COMMERCIAL

## 2025-02-24 DIAGNOSIS — M76.62 ACHILLES TENDINITIS OF LEFT LOWER EXTREMITY: ICD-10-CM

## 2025-02-24 RX ORDER — FUROSEMIDE 20 MG/1
20 TABLET ORAL DAILY
Qty: 30 TABLET | Refills: 2 | Status: SHIPPED | OUTPATIENT
Start: 2025-02-24 | End: 2026-02-24

## 2025-02-24 NOTE — TELEPHONE ENCOUNTER
Refill request:    Furosemide 20mg tab 1 tab p.o. daily  LW:11/26/24    Pharmacy: [facility]  Ph: [phone]    LV: 2/13/25  NV: 3/3/25    Pt states that his wife was at pharmacy and was told there were no refills left.

## 2025-03-03 ENCOUNTER — APPOINTMENT (OUTPATIENT)
Dept: PRIMARY CARE | Facility: CLINIC | Age: 40
End: 2025-03-03
Payer: COMMERCIAL

## 2025-03-03 VITALS
DIASTOLIC BLOOD PRESSURE: 80 MMHG | WEIGHT: 315 LBS | SYSTOLIC BLOOD PRESSURE: 132 MMHG | TEMPERATURE: 97.5 F | BODY MASS INDEX: 41.25 KG/M2

## 2025-03-03 DIAGNOSIS — M76.62 ACHILLES TENDINITIS OF LEFT LOWER EXTREMITY: Primary | ICD-10-CM

## 2025-03-03 DIAGNOSIS — S86.012A ACHILLES TENDON TEAR, LEFT, INITIAL ENCOUNTER: ICD-10-CM

## 2025-03-03 PROCEDURE — 99213 OFFICE O/P EST LOW 20 MIN: CPT | Performed by: FAMILY MEDICINE

## 2025-03-03 PROCEDURE — 1036F TOBACCO NON-USER: CPT | Performed by: FAMILY MEDICINE

## 2025-03-03 RX ORDER — HYDROCODONE BITARTRATE AND ACETAMINOPHEN 5; 325 MG/1; MG/1
1 TABLET ORAL EVERY 6 HOURS PRN
Qty: 100 TABLET | Refills: 0 | Status: SHIPPED | OUTPATIENT
Start: 2025-03-03 | End: 2025-03-28

## 2025-03-03 ASSESSMENT — ENCOUNTER SYMPTOMS
LOSS OF MOTION: 1
INABILITY TO BEAR WEIGHT: 0
TINGLING: 1
LOSS OF SENSATION: 1
MUSCLE WEAKNESS: 1

## 2025-03-03 ASSESSMENT — PATIENT HEALTH QUESTIONNAIRE - PHQ9
2. FEELING DOWN, DEPRESSED OR HOPELESS: NOT AT ALL
1. LITTLE INTEREST OR PLEASURE IN DOING THINGS: NOT AT ALL
SUM OF ALL RESPONSES TO PHQ9 QUESTIONS 1 AND 2: 0

## 2025-03-03 NOTE — PATIENT INSTRUCTIONS
I will refill your pain medication.  Return in one month for a recheck.  Try to lose weight.  It is good that you stopped drinking soda.

## 2025-03-03 NOTE — PROGRESS NOTES
Subjective   Patient ID: [id number]     Lacho Calix is a 39 y.o. male who presents for Follow-up and Med Refill.  Lower Extremity Issue  The symptoms are aggravated by movement, palpation and weight bearing.     He is here for a recheck on chronic pain.     He has an achilles tendon injury and is following orthopedics. He has ongoing pain in the ankle.  In the posterior tibialis tendon which has ruptured.        He remains on hydrocodone-APAP as needed for severe chronic pain from this condition.    Checked OARRS.  No outside scripts.   Objective     /80 (BP Location: Left arm, Patient Position: Sitting)   Temp 36.4 °C (97.5 °F) (Skin)   Wt 150 kg (330 lb)   BMI 41.25 kg/m²      Physical Exam  Musculoskeletal:      Comments: Tender at the left achilles. Tender along the left medial malleolus and posterior tibialis tendon. Guarding with palpation   Neurological:      Mental Status: He is alert.         Assessment/Plan   Problem List Items Addressed This Visit    None  Visit Diagnoses       Achilles tendinitis of left lower extremity    -  Primary    Relevant Medications    HYDROcodone-acetaminophen (Norco) 5-325 mg tablet    Achilles tendon tear, left, initial encounter        Relevant Medications    HYDROcodone-acetaminophen (Norco) 5-325 mg tablet            Addi Mendenhall, DO   Answers submitted by the patient for this visit:  Lower Extremity Injury Questionnaire (Submitted on 3/3/2025)  Chief Complaint: Lower extremity pain  Incident occurred: more than 1 week ago  Incident location: at the pool  Injury mechanism: unknown  Pain location: left heel  Pain quality: aching, cramping, shooting, stabbing  Pain - numeric: 8/10  Pain course: constant  tingling: Yes  inability to bear weight: No  loss of motion: Yes  loss of sensation: Yes  muscle weakness: Yes  Foreign body present: no foreign bodies      I will refill your pain medication.  Return in one month for a recheck.  Try to lose weight.  It is  good that you stopped drinking soda.

## 2025-04-01 ASSESSMENT — ENCOUNTER SYMPTOMS
INABILITY TO BEAR WEIGHT: 1
TINGLING: 1
LOSS OF SENSATION: 1
MUSCLE WEAKNESS: 1
LOSS OF MOTION: 1

## 2025-04-07 ENCOUNTER — APPOINTMENT (OUTPATIENT)
Dept: PRIMARY CARE | Facility: CLINIC | Age: 40
End: 2025-04-07
Payer: COMMERCIAL

## 2025-04-07 VITALS — TEMPERATURE: 97.3 F | DIASTOLIC BLOOD PRESSURE: 90 MMHG | SYSTOLIC BLOOD PRESSURE: 130 MMHG

## 2025-04-07 DIAGNOSIS — S86.012A ACHILLES TENDON TEAR, LEFT, INITIAL ENCOUNTER: ICD-10-CM

## 2025-04-07 DIAGNOSIS — M76.62 ACHILLES TENDINITIS OF LEFT LOWER EXTREMITY: Primary | ICD-10-CM

## 2025-04-07 PROCEDURE — 1036F TOBACCO NON-USER: CPT | Performed by: FAMILY MEDICINE

## 2025-04-07 PROCEDURE — 99213 OFFICE O/P EST LOW 20 MIN: CPT | Performed by: FAMILY MEDICINE

## 2025-04-07 RX ORDER — HYDROCODONE BITARTRATE AND ACETAMINOPHEN 5; 325 MG/1; MG/1
1 TABLET ORAL EVERY 6 HOURS PRN
Qty: 100 TABLET | Refills: 0 | Status: SHIPPED | OUTPATIENT
Start: 2025-04-07 | End: 2025-05-02

## 2025-04-07 NOTE — PROGRESS NOTES
Answers submitted by the patient for this visit:  Lower Extremity Injury Questionnaire (Submitted on 4/1/2025)  Chief Complaint: Lower extremity pain  Incident occurred: more than 1 week ago  Incident location: at home  Injury mechanism: unknown  Pain location: left heel  Pain quality: aching, burning, shooting, stabbing  Pain - numeric: 8/10  Pain course: constant  tingling: Yes  inability to bear weight: Yes  loss of motion: Yes  loss of sensation: Yes  muscle weakness: Yes  Foreign body present: no foreign bodies  Subjective   Patient ID: [id number]     Lacho Calix is a 39 y.o. male who presents for Follow-up and Med Refill.  Lower Extremity Issue  Nothing aggravates the symptoms.     He is here for a recheck.  He has chronic pain from multiple sources, including a ruptured left Achilles tendon and  lumbar herniated disc.      His boot is off.  He states the pain is worse.  He has had the Achilles repair.  Then he had a rupture of the posterior tibialis tendon in his calf.     Tender at ankle.     Checked OARRS.  No outisde scripts.        Objective     /90 (BP Location: Left arm, Patient Position: Sitting)   Temp 36.3 °C (97.3 °F) (Skin)      Physical Exam  Constitutional:       Appearance: He is not ill-appearing or toxic-appearing.   Musculoskeletal:      Comments: Most tender just distal to the medial malleolus.  Swelling at the superior posterior calcaneus.  Tender  at the Achilles tendon.   Neurological:      Mental Status: He is alert.         Assessment/Plan   Problem List Items Addressed This Visit    None  Visit Diagnoses       Achilles tendinitis of left lower extremity    -  Primary    Relevant Medications    HYDROcodone-acetaminophen (Norco) 5-325 mg tablet    Achilles tendon tear, left, initial encounter        Relevant Medications    HYDROcodone-acetaminophen (Norco) 5-325 mg tablet        I refilled your medication.  Follow up with podiatry for your ongoing pain.      Addi Mendenhall,  DO

## 2025-04-10 DIAGNOSIS — K21.9 GASTRO-ESOPHAGEAL REFLUX DISEASE WITHOUT ESOPHAGITIS: ICD-10-CM

## 2025-04-10 RX ORDER — OMEPRAZOLE 40 MG/1
40 CAPSULE, DELAYED RELEASE ORAL DAILY
Qty: 30 CAPSULE | Refills: 1 | Status: SHIPPED | OUTPATIENT
Start: 2025-04-10

## 2025-05-06 ENCOUNTER — APPOINTMENT (OUTPATIENT)
Dept: PRIMARY CARE | Facility: CLINIC | Age: 40
End: 2025-05-06
Payer: COMMERCIAL

## 2025-05-06 VITALS — DIASTOLIC BLOOD PRESSURE: 80 MMHG | TEMPERATURE: 96.6 F | SYSTOLIC BLOOD PRESSURE: 130 MMHG

## 2025-05-06 DIAGNOSIS — M76.62 ACHILLES TENDINITIS OF LEFT LOWER EXTREMITY: Primary | ICD-10-CM

## 2025-05-06 DIAGNOSIS — S86.012A ACHILLES TENDON TEAR, LEFT, INITIAL ENCOUNTER: ICD-10-CM

## 2025-05-06 PROCEDURE — 99213 OFFICE O/P EST LOW 20 MIN: CPT | Performed by: FAMILY MEDICINE

## 2025-05-06 PROCEDURE — 1036F TOBACCO NON-USER: CPT | Performed by: FAMILY MEDICINE

## 2025-05-06 RX ORDER — HYDROCODONE BITARTRATE AND ACETAMINOPHEN 5; 325 MG/1; MG/1
1 TABLET ORAL EVERY 6 HOURS PRN
Qty: 100 TABLET | Refills: 0 | Status: SHIPPED | OUTPATIENT
Start: 2025-05-06 | End: 2025-05-31

## 2025-05-06 RX ORDER — FUROSEMIDE 20 MG/1
20 TABLET ORAL DAILY
Qty: 30 TABLET | Refills: 2 | Status: SHIPPED | OUTPATIENT
Start: 2025-05-06 | End: 2026-05-06

## 2025-05-06 ASSESSMENT — ENCOUNTER SYMPTOMS
MUSCLE WEAKNESS: 1
LOSS OF MOTION: 1
LOSS OF SENSATION: 1
TINGLING: 0
INABILITY TO BEAR WEIGHT: 0

## 2025-05-06 NOTE — PATIENT INSTRUCTIONS
I refilled your medication.  Return in one month for a recheck.  Follow up with podiatry as directed.

## 2025-05-06 NOTE — PROGRESS NOTES
"Subjective   Patient ID: [id number]     Lacho Calix is a 39 y.o. male who presents for Follow-up and Med Refill.  Lower Extremity Issue  Nothing aggravates the symptoms.     He is here for a recheck on chronic pain. The pain is \"horrible\".  He has tried multiple shoes. Still seeing podiatry.      He is planning another MRI of the Achilles.    Objective     /80   Temp 35.9 °C (96.6 °F) (Skin)      Physical Exam  Constitutional:       Appearance: Normal appearance.   Cardiovascular:      Rate and Rhythm: Normal rate and regular rhythm.      Heart sounds: Normal heart sounds. No murmur heard.  Pulmonary:      Effort: Pulmonary effort is normal. No respiratory distress.      Breath sounds: Normal breath sounds.   Musculoskeletal:      Right lower leg: No edema.      Left lower leg: No edema.      Comments: Tender at the left Achilles.  More tender at the posterior tibialis tendons just distal to the medial malleolus.       Skin:     Comments: No redness.  No open wounds.    Neurological:      General: No focal deficit present.      Mental Status: He is alert and oriented to person, place, and time.      Gait: Gait abnormal (antalgic gait.).         Assessment/Plan   Problem List Items Addressed This Visit    None  Visit Diagnoses         Achilles tendinitis of left lower extremity    -  Primary    Relevant Medications    HYDROcodone-acetaminophen (Norco) 5-325 mg tablet    furosemide (Lasix) 20 mg tablet      Achilles tendon tear, left, initial encounter        Relevant Medications    HYDROcodone-acetaminophen (Norco) 5-325 mg tablet            Addi Mendenhall,    Answers submitted by the patient for this visit:  Lower Extremity Injury Questionnaire (Submitted on 5/6/2025)  Chief Complaint: Lower extremity pain  Incident occurred: more than 1 week ago  Incident location: at home  Injury mechanism: unknown  Pain location: left heel  Pain quality: aching, burning, shooting, stabbing  Pain - numeric: " 8/10  Pain course: constant  tingling: No  inability to bear weight: No  loss of motion: Yes  loss of sensation: Yes  muscle weakness: Yes  Foreign body present: no foreign bodies  I refilled your medication.  Return in one month for a recheck.  Follow up with podiatry as directed.

## 2025-06-03 ENCOUNTER — APPOINTMENT (OUTPATIENT)
Dept: PRIMARY CARE | Facility: CLINIC | Age: 40
End: 2025-06-03
Payer: COMMERCIAL

## 2025-06-03 VITALS — TEMPERATURE: 97.9 F | SYSTOLIC BLOOD PRESSURE: 130 MMHG | DIASTOLIC BLOOD PRESSURE: 78 MMHG

## 2025-06-03 DIAGNOSIS — K21.9 GASTRO-ESOPHAGEAL REFLUX DISEASE WITHOUT ESOPHAGITIS: ICD-10-CM

## 2025-06-03 DIAGNOSIS — M76.62 ACHILLES TENDINITIS OF LEFT LOWER EXTREMITY: ICD-10-CM

## 2025-06-03 DIAGNOSIS — S86.012A ACHILLES TENDON TEAR, LEFT, INITIAL ENCOUNTER: ICD-10-CM

## 2025-06-03 PROCEDURE — 1036F TOBACCO NON-USER: CPT | Performed by: FAMILY MEDICINE

## 2025-06-03 PROCEDURE — 99213 OFFICE O/P EST LOW 20 MIN: CPT | Performed by: FAMILY MEDICINE

## 2025-06-03 RX ORDER — HYDROCODONE BITARTRATE AND ACETAMINOPHEN 5; 325 MG/1; MG/1
1 TABLET ORAL EVERY 6 HOURS PRN
Qty: 100 TABLET | Refills: 0 | Status: SHIPPED | OUTPATIENT
Start: 2025-06-03 | End: 2025-06-28

## 2025-06-03 RX ORDER — OMEPRAZOLE 40 MG/1
40 CAPSULE, DELAYED RELEASE ORAL DAILY
Qty: 30 CAPSULE | Refills: 1 | Status: SHIPPED | OUTPATIENT
Start: 2025-06-03

## 2025-06-03 RX ORDER — FUROSEMIDE 20 MG/1
20 TABLET ORAL DAILY
Qty: 30 TABLET | Refills: 2 | Status: SHIPPED | OUTPATIENT
Start: 2025-06-03 | End: 2026-06-03

## 2025-06-03 ASSESSMENT — ENCOUNTER SYMPTOMS
LOSS OF SENSATION: 0
INABILITY TO BEAR WEIGHT: 1
LOSS OF MOTION: 1
MUSCLE WEAKNESS: 1
TINGLING: 1

## 2025-06-03 ASSESSMENT — PATIENT HEALTH QUESTIONNAIRE - PHQ9
SUM OF ALL RESPONSES TO PHQ9 QUESTIONS 1 AND 2: 0
1. LITTLE INTEREST OR PLEASURE IN DOING THINGS: NOT AT ALL
2. FEELING DOWN, DEPRESSED OR HOPELESS: NOT AT ALL

## 2025-06-03 NOTE — PATIENT INSTRUCTIONS
I refilled your medication.  Return in one month for a recheck and sooner if you have any new or worsened problems.

## 2025-06-03 NOTE — PROGRESS NOTES
Subjective   Patient ID: [id number]     Lacho Calix is a 39 y.o. male who presents for Follow-up and Med Refill.  Med Refill    Lower Extremity Issue  The symptoms are aggravated by movement, palpation and weight bearing.     He is here for a recheck on his chronic pain from an Achilles tendon rupture and posterior tibialis tendon rupture.    Checked OARRS.  No outside scripts.     He can't get a hold of his podiatrist so he has an appointment with a contact at [facility].  He is seeing a podiatrist at [facility] in mid July.       Objective     /78   Temp 36.6 °C (97.9 °F) (Oral) Comment (Src): Declined     Physical Exam  Constitutional:       Appearance: He is not ill-appearing.   Musculoskeletal:      Comments: Tender with inversion, eversion. Tender at the Achilles and just posterior to the medial malleolus.  Posterior tibialis tendon tender.    Neurological:      Mental Status: He is alert.         Assessment/Plan   Problem List Items Addressed This Visit    None  Visit Diagnoses         Gastro-esophageal reflux disease without esophagitis          Achilles tendinitis of left lower extremity          Achilles tendon tear, left, initial encounter            I refilled your medication.  Return in one month for a recheck and sooner if you have any new or worsened problems.     Addi Mendenhall, DO   Answers submitted by the patient for this visit:  Lower Extremity Injury Questionnaire (Submitted on 6/3/2025)  Chief Complaint: Lower extremity pain  Incident occurred: more than 1 week ago  Incident location: in the street  Injury mechanism: unknown  Pain location: left heel  Pain quality: aching, burning, shooting, stabbing  Pain - numeric: 8/10  Pain course: constant  tingling: Yes  inability to bear weight: Yes  loss of motion: Yes  loss of sensation: No  muscle weakness: Yes  Foreign body present: no foreign bodies

## 2025-07-08 ENCOUNTER — APPOINTMENT (OUTPATIENT)
Dept: PRIMARY CARE | Facility: CLINIC | Age: 40
End: 2025-07-08
Payer: COMMERCIAL

## 2025-07-08 VITALS
HEIGHT: 74 IN | TEMPERATURE: 97.3 F | DIASTOLIC BLOOD PRESSURE: 80 MMHG | SYSTOLIC BLOOD PRESSURE: 142 MMHG | BODY MASS INDEX: 42.37 KG/M2

## 2025-07-08 DIAGNOSIS — S86.012A ACHILLES TENDON TEAR, LEFT, INITIAL ENCOUNTER: Primary | ICD-10-CM

## 2025-07-08 DIAGNOSIS — E66.813 OBESITY, CLASS III, BMI 40-49.9 (MORBID OBESITY): ICD-10-CM

## 2025-07-08 DIAGNOSIS — M76.62 ACHILLES TENDINITIS OF LEFT LOWER EXTREMITY: ICD-10-CM

## 2025-07-08 PROCEDURE — 99213 OFFICE O/P EST LOW 20 MIN: CPT | Performed by: FAMILY MEDICINE

## 2025-07-08 PROCEDURE — 1036F TOBACCO NON-USER: CPT | Performed by: FAMILY MEDICINE

## 2025-07-08 RX ORDER — HYDROCODONE BITARTRATE AND ACETAMINOPHEN 5; 325 MG/1; MG/1
1 TABLET ORAL EVERY 6 HOURS PRN
Qty: 100 TABLET | Refills: 0 | Status: SHIPPED | OUTPATIENT
Start: 2025-07-08 | End: 2025-08-02

## 2025-07-08 RX ORDER — TIRZEPATIDE 2.5 MG/.5ML
2.5 INJECTION, SOLUTION SUBCUTANEOUS WEEKLY
Qty: 2 ML | Refills: 1 | Status: SHIPPED | OUTPATIENT
Start: 2025-07-08

## 2025-07-08 NOTE — PROGRESS NOTES
"Subjective   Patient ID: [id number]     Lacho Cailx is a 39 y.o. male who presents for Follow-up and Med Refill.  HPI  He is here for a recheck. He has pain at the heel at the bony protrusion from his surgery.      He is going to [facility] now to a podiatrist in [place] now.     He is seeing that doctor on July 17.      He has trouble losing weight.  He has tried dropping soda and sugar and that has not helped.       Objective     /80   Temp 36.3 °C (97.3 °F) (Skin)   Ht 1.88 m (6' 2\")   BMI 42.37 kg/m²      Physical Exam  Constitutional:       Appearance: Normal appearance.   Musculoskeletal:      Comments: No effusion.  Tender at the posterior superior calcaneus.  Posterior tibialis tendon tender at the medial malleolus.       Neurological:      Mental Status: He is alert.         Assessment/Plan   Problem List Items Addressed This Visit    None  Visit Diagnoses         Achilles tendon tear, left, initial encounter    -  Primary    Relevant Medications    HYDROcodone-acetaminophen (Norco) 5-325 mg tablet      Achilles tendinitis of left lower extremity        Relevant Medications    HYDROcodone-acetaminophen (Norco) 5-325 mg tablet      Obesity, Class III, BMI 40-49.9 (morbid obesity)        Relevant Medications    tirzepatide (Mounjaro) 2.5 mg/0.5 mL pen injector        I will refill your medication. Weight loss is important to your good health.  I will order Mounjaro to help you lose weight.  Return in one month for a recheck.     Addi Mendenhall, DO   "

## 2025-07-08 NOTE — PATIENT INSTRUCTIONS
I will refill your medication. Weight loss is important to your good health.  I will order Mounjaro to help you lose weight.  Return in one month for a recheck.

## 2025-08-08 ENCOUNTER — APPOINTMENT (OUTPATIENT)
Dept: PRIMARY CARE | Facility: CLINIC | Age: 40
End: 2025-08-08
Payer: COMMERCIAL

## 2025-08-08 VITALS — TEMPERATURE: 96.8 F | DIASTOLIC BLOOD PRESSURE: 82 MMHG | SYSTOLIC BLOOD PRESSURE: 130 MMHG

## 2025-08-08 DIAGNOSIS — S86.012A ACHILLES TENDON TEAR, LEFT, INITIAL ENCOUNTER: Primary | ICD-10-CM

## 2025-08-08 DIAGNOSIS — M76.62 ACHILLES TENDINITIS OF LEFT LOWER EXTREMITY: ICD-10-CM

## 2025-08-08 DIAGNOSIS — E66.813 OBESITY, CLASS III, BMI 40-49.9 (MORBID OBESITY): ICD-10-CM

## 2025-08-08 PROCEDURE — 99213 OFFICE O/P EST LOW 20 MIN: CPT | Performed by: FAMILY MEDICINE

## 2025-08-08 RX ORDER — HYDROCODONE BITARTRATE AND ACETAMINOPHEN 5; 325 MG/1; MG/1
1 TABLET ORAL EVERY 6 HOURS PRN
Qty: 100 TABLET | Refills: 0 | Status: SHIPPED | OUTPATIENT
Start: 2025-08-08 | End: 2025-09-02

## 2025-08-08 RX ORDER — TIRZEPATIDE 5 MG/.5ML
5 INJECTION, SOLUTION SUBCUTANEOUS WEEKLY
Qty: 2 ML | Refills: 1 | Status: SHIPPED | OUTPATIENT
Start: 2025-08-08

## 2025-08-08 ASSESSMENT — ENCOUNTER SYMPTOMS
INABILITY TO BEAR WEIGHT: 1
MUSCLE WEAKNESS: 1
LOSS OF SENSATION: 1
TINGLING: 0
LOSS OF MOTION: 1

## 2025-08-08 NOTE — PROGRESS NOTES
Subjective   Patient ID: [id number]     Lacho Calix is a 39 y.o. male who presents for Follow-up (One month) and Med Refill.  Med Refill    Lower Extremity Issue  The symptoms are aggravated by movement, palpation and weight bearing.       He is here for a recheck on chronic Achilles pain. Answers submitted by the patient for this visit:  Lower Extremity Injury Questionnaire (Submitted on 8/8/2025)  Chief Complaint: Lower extremity pain  Incident occurred: more than 1 week ago  Incident location: in the yard  Injury mechanism: unknown  Pain location: left heel  Pain quality: aching, burning, shooting, stabbing  Pain - numeric: 8/10  Pain course: constant  tingling: No  inability to bear weight: Yes  loss of motion: Yes  loss of sensation: Yes  muscle weakness: Yes  Foreign body present: no foreign bodies      He saw the specialist at [facility].  He is planning surgery in January on the left leg.  He is on Mounjaro.  They are wanting him to lose more weight prior to the surgery.     He has 13 bone spurs and fragments in his left heel.    Objective     /82 (BP Location: Left arm, Patient Position: Sitting)   Temp 36 °C (96.8 °F) (Skin)      Physical Exam    Musculoskeletal:      Comments: Tender and guarding at the left Achilles tendon insertion.         Assessment/Plan   Problem List Items Addressed This Visit    None  Visit Diagnoses         Achilles tendon tear, left, initial encounter    -  Primary    Relevant Medications    HYDROcodone-acetaminophen (Norco) 5-325 mg tablet    Other Relevant Orders    Drug Screen, Urine With Reflex to Confirmation      Achilles tendinitis of left lower extremity        Relevant Medications    HYDROcodone-acetaminophen (Norco) 5-325 mg tablet    Other Relevant Orders    Drug Screen, Urine With Reflex to Confirmation      Obesity, Class III, BMI 40-49.9 (morbid obesity)        Relevant Medications    tirzepatide (Mounjaro) 5 mg/0.5 mL pen injector        I refilled your  medication.  Continue to lose weight as you approach your surgery date in January.  I increased your dose of Mounjaro to 5 mg weekly since you have no side effects to it.     Addi Mendenhall, DO

## 2025-08-08 NOTE — PATIENT INSTRUCTIONS
I refilled your medication.  Continue to lose weight as you approach your surgery date in January.  I increased your dose of Mounjaro to 5 mg weekly since you have no side effects to it.

## 2025-08-11 LAB
AMPHETAMINES UR QL: NEGATIVE NG/ML
BARBITURATES UR QL: NEGATIVE NG/ML
BENZODIAZ UR QL: NEGATIVE NG/ML
BZE UR QL: NEGATIVE NG/ML
CODEINE UR-MCNC: NEGATIVE NG/ML
CREAT UR-MCNC: 273.9 MG/DL
DRUG SCREEN COMMENT UR-IMP: ABNORMAL
FENTANYL UR QL SCN: NEGATIVE NG/ML
HYDROCODONE UR-MCNC: 743 NG/ML
HYDROMORPHONE UR-MCNC: 436 NG/ML
METHADONE UR QL: NEGATIVE NG/ML
MORPHINE UR-MCNC: NEGATIVE NG/ML
NORHYDROCODONE UR CFM-MCNC: 629 NG/ML
OPIATES UR QL: POSITIVE NG/ML
OXIDANTS UR QL: NEGATIVE MCG/ML
OXYCODONE UR QL: NEGATIVE NG/ML
PCP UR QL: NEGATIVE NG/ML
PH UR: 6.9 [PH] (ref 4.5–9)
QUEST NOTES AND COMMENTS: ABNORMAL
THC UR QL: NEGATIVE NG/ML

## 2025-08-12 DIAGNOSIS — K21.9 GASTRO-ESOPHAGEAL REFLUX DISEASE WITHOUT ESOPHAGITIS: ICD-10-CM

## 2025-08-12 RX ORDER — OMEPRAZOLE 40 MG/1
40 CAPSULE, DELAYED RELEASE ORAL DAILY
Qty: 30 CAPSULE | Refills: 1 | Status: SHIPPED | OUTPATIENT
Start: 2025-08-12

## 2025-09-09 ENCOUNTER — APPOINTMENT (OUTPATIENT)
Dept: PRIMARY CARE | Facility: CLINIC | Age: 40
End: 2025-09-09
Payer: COMMERCIAL

## (undated) DEVICE — GLOVE, SURGICAL, PROTEXIS PI ORTHO, 7.5, PF, LF

## (undated) DEVICE — Device

## (undated) DEVICE — STRIP, SKIN CLOSURE, STERI-STRIP, REINFORCED, 0.25 X 4 IN

## (undated) DEVICE — BANDAGE, ELASTIC, ACE, SELF-CLOSURE, 3 IN X 5 YD, NONSTERILE

## (undated) DEVICE — NEEDLE, TAPER, MAYO, 1/2 CIRCLE, 1

## (undated) DEVICE — SUTURE, MONOCRYL, 4-0, 18 IN, PS2, UNDYED

## (undated) DEVICE — APPLICATOR, CHLORAPREP, W/ORANGE TINT, 26ML

## (undated) DEVICE — SUTURE, VICRYL, 4-0, 70CM, TAPER SH

## (undated) DEVICE — ADHESIVE, SKIN, MASTISOL, 2/3 CC VIAL

## (undated) DEVICE — BLADE, OSCILLATING/SAGITTAL, 31MM X 9MM

## (undated) DEVICE — SUTURE, ETHILON, 3/0, FS1, 18 IN, BLK MONO